# Patient Record
Sex: FEMALE | Race: WHITE | NOT HISPANIC OR LATINO | Employment: FULL TIME | URBAN - METROPOLITAN AREA
[De-identification: names, ages, dates, MRNs, and addresses within clinical notes are randomized per-mention and may not be internally consistent; named-entity substitution may affect disease eponyms.]

---

## 2017-02-15 ENCOUNTER — HOSPITAL ENCOUNTER (OUTPATIENT)
Dept: LAB | Facility: MEDICAL CENTER | Age: 57
End: 2017-02-15
Attending: FAMILY MEDICINE
Payer: COMMERCIAL

## 2017-02-15 LAB
ALT SERPL-CCNC: 43 U/L (ref 2–50)
AST SERPL-CCNC: 20 U/L (ref 12–45)
CHOLEST SERPL-MCNC: 222 MG/DL (ref 100–199)
HDLC SERPL-MCNC: 36 MG/DL
LDLC SERPL CALC-MCNC: 138 MG/DL
TRIGL SERPL-MCNC: 241 MG/DL (ref 0–149)

## 2017-02-15 PROCEDURE — 84460 ALANINE AMINO (ALT) (SGPT): CPT

## 2017-02-15 PROCEDURE — 80061 LIPID PANEL: CPT

## 2017-02-15 PROCEDURE — 36415 COLL VENOUS BLD VENIPUNCTURE: CPT

## 2017-02-15 PROCEDURE — 84450 TRANSFERASE (AST) (SGOT): CPT

## 2017-07-27 ENCOUNTER — HOSPITAL ENCOUNTER (OUTPATIENT)
Dept: LAB | Facility: MEDICAL CENTER | Age: 57
End: 2017-07-27
Attending: FAMILY MEDICINE
Payer: COMMERCIAL

## 2017-07-27 LAB
ALT SERPL-CCNC: 21 U/L (ref 2–50)
AST SERPL-CCNC: 15 U/L (ref 12–45)
CHOLEST SERPL-MCNC: 204 MG/DL (ref 100–199)
HDLC SERPL-MCNC: 34 MG/DL
LDLC SERPL CALC-MCNC: 131 MG/DL
TRIGL SERPL-MCNC: 197 MG/DL (ref 0–149)

## 2017-07-27 PROCEDURE — 84450 TRANSFERASE (AST) (SGOT): CPT

## 2017-07-27 PROCEDURE — 84460 ALANINE AMINO (ALT) (SGPT): CPT

## 2017-07-27 PROCEDURE — 80061 LIPID PANEL: CPT

## 2017-07-27 PROCEDURE — 36415 COLL VENOUS BLD VENIPUNCTURE: CPT

## 2018-02-21 ENCOUNTER — HOSPITAL ENCOUNTER (OUTPATIENT)
Dept: LAB | Facility: MEDICAL CENTER | Age: 58
End: 2018-02-21
Attending: FAMILY MEDICINE
Payer: COMMERCIAL

## 2018-02-21 LAB
ALT SERPL-CCNC: 51 U/L (ref 2–50)
AST SERPL-CCNC: 28 U/L (ref 12–45)
CHOLEST SERPL-MCNC: 240 MG/DL (ref 100–199)
HDLC SERPL-MCNC: 36 MG/DL
LDLC SERPL CALC-MCNC: ABNORMAL MG/DL
TRIGL SERPL-MCNC: 646 MG/DL (ref 0–149)

## 2018-02-21 PROCEDURE — 84460 ALANINE AMINO (ALT) (SGPT): CPT

## 2018-02-21 PROCEDURE — 84450 TRANSFERASE (AST) (SGOT): CPT

## 2018-02-21 PROCEDURE — 36415 COLL VENOUS BLD VENIPUNCTURE: CPT

## 2018-02-21 PROCEDURE — 80061 LIPID PANEL: CPT

## 2018-08-27 ENCOUNTER — HOSPITAL ENCOUNTER (OUTPATIENT)
Dept: LAB | Facility: MEDICAL CENTER | Age: 58
End: 2018-08-27
Attending: FAMILY MEDICINE
Payer: COMMERCIAL

## 2018-08-27 LAB
ALT SERPL-CCNC: 49 U/L (ref 2–50)
AST SERPL-CCNC: 23 U/L (ref 12–45)
CHOLEST SERPL-MCNC: 132 MG/DL (ref 100–199)
HDLC SERPL-MCNC: 37 MG/DL
LDLC SERPL CALC-MCNC: 76 MG/DL
TRIGL SERPL-MCNC: 93 MG/DL (ref 0–149)

## 2018-08-27 PROCEDURE — 80061 LIPID PANEL: CPT

## 2018-08-27 PROCEDURE — 36415 COLL VENOUS BLD VENIPUNCTURE: CPT

## 2018-08-27 PROCEDURE — 84460 ALANINE AMINO (ALT) (SGPT): CPT

## 2018-08-27 PROCEDURE — 84450 TRANSFERASE (AST) (SGOT): CPT

## 2018-09-04 ENCOUNTER — HOSPITAL ENCOUNTER (OUTPATIENT)
Dept: RADIOLOGY | Facility: MEDICAL CENTER | Age: 58
End: 2018-09-04
Attending: FAMILY MEDICINE
Payer: COMMERCIAL

## 2018-09-04 DIAGNOSIS — Z12.31 VISIT FOR SCREENING MAMMOGRAM: ICD-10-CM

## 2018-09-04 PROCEDURE — 77067 SCR MAMMO BI INCL CAD: CPT

## 2019-04-11 ENCOUNTER — OFFICE VISIT (OUTPATIENT)
Dept: MEDICAL GROUP | Facility: PHYSICIAN GROUP | Age: 59
End: 2019-04-11

## 2019-04-11 VITALS
RESPIRATION RATE: 16 BRPM | OXYGEN SATURATION: 94 % | TEMPERATURE: 97.1 F | HEIGHT: 67 IN | HEART RATE: 81 BPM | DIASTOLIC BLOOD PRESSURE: 86 MMHG | WEIGHT: 185.4 LBS | SYSTOLIC BLOOD PRESSURE: 130 MMHG | BODY MASS INDEX: 29.1 KG/M2

## 2019-04-11 DIAGNOSIS — E78.5 HYPERLIPIDEMIA, UNSPECIFIED HYPERLIPIDEMIA TYPE: ICD-10-CM

## 2019-04-11 DIAGNOSIS — Z12.11 SCREENING FOR COLON CANCER: ICD-10-CM

## 2019-04-11 PROCEDURE — 99203 OFFICE O/P NEW LOW 30 MIN: CPT | Performed by: INTERNAL MEDICINE

## 2019-04-11 RX ORDER — ATORVASTATIN CALCIUM 20 MG/1
TABLET, FILM COATED ORAL
Refills: 1 | COMMUNITY
Start: 2019-01-14 | End: 2019-07-31 | Stop reason: SDUPTHER

## 2019-04-11 RX ORDER — ZOSTER VACCINE RECOMBINANT, ADJUVANTED 50 MCG/0.5
KIT INTRAMUSCULAR
Refills: 0 | COMMUNITY
Start: 2019-04-06 | End: 2019-04-10

## 2019-04-11 ASSESSMENT — PATIENT HEALTH QUESTIONNAIRE - PHQ9: CLINICAL INTERPRETATION OF PHQ2 SCORE: 0

## 2019-04-11 NOTE — ASSESSMENT & PLAN NOTE
Lab Results   Component Value Date/Time    CHOLSTRLTOT 132 08/27/2018 06:18 AM    LDL 76 08/27/2018 06:18 AM    HDL 37 (A) 08/27/2018 06:18 AM    TRIGLYCERIDE 93 08/27/2018 06:18 AM     Taking atorvastatin 20 mg daily for about a year. Can't recall what she was on prior to the atorvastatin. She lost 30 lbs in 9 months.

## 2019-04-11 NOTE — PROGRESS NOTES
PRIMARY CARE CLINIC NEW PATIENT H&P  Chief Complaint   Patient presents with   • Hyperlipidemia     History of Present Illness     Hyperlipidemia  Lab Results   Component Value Date/Time    CHOLSTRLTOT 132 2018 06:18 AM    LDL 76 2018 06:18 AM    HDL 37 (A) 2018 06:18 AM    TRIGLYCERIDE 93 2018 06:18 AM     Taking atorvastatin 20 mg daily for about a year. Can't recall what she was on prior to the atorvastatin. She lost 30 lbs in 9 months.     Current Outpatient Prescriptions   Medication Sig Dispense Refill   • atorvastatin (LIPITOR) 20 MG Tab Take  by mouth.  1     No current facility-administered medications for this visit.      Past Medical History:   Diagnosis Date   • Hyperlipidemia 2019     Past Surgical History:   Procedure Laterality Date   • HYSTERECTOMY, TOTAL ABDOMINAL      ovaries remain; for endometriosis    • TONSILLECTOMY       Social History   Substance Use Topics   • Smoking status: Former Smoker     Packs/day: 1.00     Years: 20.00     Quit date:    • Smokeless tobacco: Never Used   • Alcohol use Yes      Comment: infrequently      Social History     Social History Narrative    /      Family History   Problem Relation Age of Onset   • Lung Disease Father         COPD, smoker      Family Status   Relation Status   • Mo Alive   • Fa    • Bro Alive     Allergies: Patient has no known allergies.    ROS  Constitutional: Negative for fatigue/generalized weakness.   HEENT: Negative for  vision changes, hearing changes    Respiratory: Negative for shortness of breath  Cardiovascular: Negative for chest pain, palpitations  Gastrointestinal: Negative for blood in stool, constipation, diarrhea  Genitourinary: Negative for dysuria, polyuria  Musculoskeletal: Negative for myalgias, back pain, and joint pain.   Skin: Negative for rash  Neurological: Negative for numbness, tingling  Psychiatric/Behavioral: Negative for depression,  "anxiety       Objective   /86   Pulse 81   Temp 36.2 °C (97.1 °F)   Resp 16   Ht 1.689 m (5' 6.5\")   Wt 84.1 kg (185 lb 6.4 oz)   SpO2 94%  Body mass index is 29.48 kg/m².    General: Alert, oriented. In no acute distress   HEET: EOMI, PERRL, conjunctiva non-injected, sclera non-icteric.  Nares patent with no significant congestion or drainage.  Avani pinnae, external auditory canals, TM pearly gray with normal light reflex bilaterally.Oral mucous membranes pink and moist with no lesions.  Neck: supple with no cervical, subclavicular lymphadenopathy, JVD, palpable thyroid nodules   Lungs: clear to auscultation bilaterally with good excursion.  CV: regular rate and rhythm.  Abdomen soft, non-distended, non-tender with normal bowel sounds. No hepatosplenomegaly, no masses palpated  Skin: no lesions. Warm, dry   Psychiatric: appropriate mood and affect     Assessment and Plan   The following treatment plan was discussed     1. Screening for colon cancer  - REFERRAL TO GI FOR COLONOSCOPY    2. Hyperlipidemia, unspecified hyperlipidemia type  - Comp Metabolic Panel; Future  - CBC WITH DIFFERENTIAL; Future  - Lipid Profile; Future  - VITAMIN D,25 HYDROXY; Future      Return in about 1 year (around 4/11/2020).    Health Maintenance      Health Maintenance Due   Topic Date Due   • COLONOSCOPY  02/21/2010       Hayden Yarbrough MD  Internal Medicine  [facility]                   "

## 2019-07-19 ENCOUNTER — HOSPITAL ENCOUNTER (OUTPATIENT)
Dept: LAB | Facility: MEDICAL CENTER | Age: 59
End: 2019-07-19
Attending: INTERNAL MEDICINE
Payer: COMMERCIAL

## 2019-07-19 DIAGNOSIS — E78.5 HYPERLIPIDEMIA, UNSPECIFIED HYPERLIPIDEMIA TYPE: ICD-10-CM

## 2019-07-19 LAB
25(OH)D3 SERPL-MCNC: 68 NG/ML (ref 30–100)
ALBUMIN SERPL BCP-MCNC: 4.4 G/DL (ref 3.2–4.9)
ALBUMIN/GLOB SERPL: 1.5 G/DL
ALP SERPL-CCNC: 73 U/L (ref 30–99)
ALT SERPL-CCNC: 37 U/L (ref 2–50)
ANION GAP SERPL CALC-SCNC: 9 MMOL/L (ref 0–11.9)
AST SERPL-CCNC: 16 U/L (ref 12–45)
BILIRUB SERPL-MCNC: 0.3 MG/DL (ref 0.1–1.5)
BUN SERPL-MCNC: 26 MG/DL (ref 8–22)
CALCIUM SERPL-MCNC: 9.6 MG/DL (ref 8.5–10.5)
CHLORIDE SERPL-SCNC: 110 MMOL/L (ref 96–112)
CHOLEST SERPL-MCNC: 138 MG/DL (ref 100–199)
CO2 SERPL-SCNC: 22 MMOL/L (ref 20–33)
CREAT SERPL-MCNC: 0.84 MG/DL (ref 0.5–1.4)
FASTING STATUS PATIENT QL REPORTED: NORMAL
GLOBULIN SER CALC-MCNC: 2.9 G/DL (ref 1.9–3.5)
GLUCOSE SERPL-MCNC: 105 MG/DL (ref 65–99)
HDLC SERPL-MCNC: 37 MG/DL
LDLC SERPL CALC-MCNC: 61 MG/DL
POTASSIUM SERPL-SCNC: 3.9 MMOL/L (ref 3.6–5.5)
PROT SERPL-MCNC: 7.3 G/DL (ref 6–8.2)
SODIUM SERPL-SCNC: 141 MMOL/L (ref 135–145)
TRIGL SERPL-MCNC: 199 MG/DL (ref 0–149)

## 2019-07-19 PROCEDURE — 82306 VITAMIN D 25 HYDROXY: CPT

## 2019-07-19 PROCEDURE — 80061 LIPID PANEL: CPT

## 2019-07-19 PROCEDURE — 36415 COLL VENOUS BLD VENIPUNCTURE: CPT

## 2019-07-19 PROCEDURE — 85025 COMPLETE CBC W/AUTO DIFF WBC: CPT

## 2019-07-19 PROCEDURE — 80053 COMPREHEN METABOLIC PANEL: CPT

## 2019-07-20 LAB
BASOPHILS # BLD AUTO: 0.9 % (ref 0–1.8)
BASOPHILS # BLD: 0.07 K/UL (ref 0–0.12)
EOSINOPHIL # BLD AUTO: 0.3 K/UL (ref 0–0.51)
EOSINOPHIL NFR BLD: 3.7 % (ref 0–6.9)
ERYTHROCYTE [DISTWIDTH] IN BLOOD BY AUTOMATED COUNT: 44.6 FL (ref 35.9–50)
HCT VFR BLD AUTO: 50.7 % (ref 37–47)
HGB BLD-MCNC: 16 G/DL (ref 12–16)
IMM GRANULOCYTES # BLD AUTO: 0.04 K/UL (ref 0–0.11)
IMM GRANULOCYTES NFR BLD AUTO: 0.5 % (ref 0–0.9)
LYMPHOCYTES # BLD AUTO: 1.93 K/UL (ref 1–4.8)
LYMPHOCYTES NFR BLD: 23.7 % (ref 22–41)
MCH RBC QN AUTO: 29.7 PG (ref 27–33)
MCHC RBC AUTO-ENTMCNC: 31.6 G/DL (ref 33.6–35)
MCV RBC AUTO: 94.1 FL (ref 81.4–97.8)
MONOCYTES # BLD AUTO: 0.81 K/UL (ref 0–0.85)
MONOCYTES NFR BLD AUTO: 9.9 % (ref 0–13.4)
NEUTROPHILS # BLD AUTO: 5.01 K/UL (ref 2–7.15)
NEUTROPHILS NFR BLD: 61.3 % (ref 44–72)
NRBC # BLD AUTO: 0 K/UL
NRBC BLD-RTO: 0 /100 WBC
PLATELET # BLD AUTO: 247 K/UL (ref 164–446)
PMV BLD AUTO: 11 FL (ref 9–12.9)
RBC # BLD AUTO: 5.39 M/UL (ref 4.2–5.4)
WBC # BLD AUTO: 8.2 K/UL (ref 4.8–10.8)

## 2019-07-22 ENCOUNTER — TELEPHONE (OUTPATIENT)
Dept: MEDICAL GROUP | Facility: PHYSICIAN GROUP | Age: 59
End: 2019-07-22

## 2019-07-22 NOTE — TELEPHONE ENCOUNTER
----- Message from Hayden Yarbrough M.D. sent at 7/22/2019  9:10 AM PDT -----  Please call her and let her know that her fasting sugar was slightly elevated and her triglycerides are a bit high as well. Would like f/u appt to discuss

## 2019-09-24 RX ORDER — ATORVASTATIN CALCIUM 20 MG/1
TABLET, FILM COATED ORAL
Qty: 90 TAB | Refills: 1 | Status: SHIPPED | OUTPATIENT
Start: 2019-09-24 | End: 2020-03-16

## 2019-09-24 NOTE — TELEPHONE ENCOUNTER
Was the patient seen in the last year in this department? Yes    Does patient have an active prescription for medications requested? No     Received Request Via: Pharmacy    Pt met protocol?: Yes     Last OV 04/11/19    Lab Results   Component Value Date/Time    CHOLSTRLTOT 138 07/19/2019 0634    TRIGLYCERIDE 199 (H) 07/19/2019 0634    HDL 37 (A) 07/19/2019 0634    LDL 61 07/19/2019 0634

## 2019-09-24 NOTE — TELEPHONE ENCOUNTER
Pt has had OV within the 12 month protocol and lipid panel is current. 6 month supply sent to pharmacy.   Lab Results   Component Value Date/Time    CHOLSTRLTOT 138 07/19/2019 06:34 AM    LDL 61 07/19/2019 06:34 AM    HDL 37 (A) 07/19/2019 06:34 AM    TRIGLYCERIDE 199 (H) 07/19/2019 06:34 AM       Lab Results   Component Value Date/Time    SODIUM 141 07/19/2019 06:34 AM    POTASSIUM 3.9 07/19/2019 06:34 AM    CHLORIDE 110 07/19/2019 06:34 AM    CO2 22 07/19/2019 06:34 AM    GLUCOSE 105 (H) 07/19/2019 06:34 AM    BUN 26 (H) 07/19/2019 06:34 AM    CREATININE 0.84 07/19/2019 06:34 AM     Lab Results   Component Value Date/Time    ALKPHOSPHAT 73 07/19/2019 06:34 AM    ASTSGOT 16 07/19/2019 06:34 AM    ALTSGPT 37 07/19/2019 06:34 AM    TBILIRUBIN 0.3 07/19/2019 06:34 AM

## 2020-06-08 RX ORDER — ATORVASTATIN CALCIUM 20 MG/1
20 TABLET, FILM COATED ORAL DAILY
Qty: 30 TAB | Refills: 0 | Status: SHIPPED | OUTPATIENT
Start: 2020-06-08 | End: 2020-07-07

## 2020-06-08 NOTE — TELEPHONE ENCOUNTER
Last seen by PCP 04/11/2019. Needs to establish with new pcp.      Will send 1 month(s) to the pharmacy. Pt to make appt and get labs prior to more refills. Given 3 month courtesy fill already    Lab Results   Component Value Date/Time    CHOLSTRLTOT 138 07/19/2019 06:34 AM    LDL 61 07/19/2019 06:34 AM    HDL 37 (A) 07/19/2019 06:34 AM    TRIGLYCERIDE 199 (H) 07/19/2019 06:34 AM       Lab Results   Component Value Date/Time    SODIUM 141 07/19/2019 06:34 AM    POTASSIUM 3.9 07/19/2019 06:34 AM    CHLORIDE 110 07/19/2019 06:34 AM    CO2 22 07/19/2019 06:34 AM    GLUCOSE 105 (H) 07/19/2019 06:34 AM    BUN 26 (H) 07/19/2019 06:34 AM    CREATININE 0.84 07/19/2019 06:34 AM     Lab Results   Component Value Date/Time    ALKPHOSPHAT 73 07/19/2019 06:34 AM    ASTSGOT 16 07/19/2019 06:34 AM    ALTSGPT 37 07/19/2019 06:34 AM    TBILIRUBIN 0.3 07/19/2019 06:34 AM

## 2020-07-07 RX ORDER — ATORVASTATIN CALCIUM 20 MG/1
TABLET, FILM COATED ORAL
Qty: 14 TAB | Refills: 0 | Status: SHIPPED | OUTPATIENT
Start: 2020-07-07 | End: 2020-08-31

## 2020-07-07 NOTE — TELEPHONE ENCOUNTER
Patient needs new PCP. Last seen by PCP 4/11/2019. Will send 14 days(s) to the pharmacy.  Pt to make appt prior to more refills.

## 2020-08-28 RX ORDER — ATORVASTATIN CALCIUM 20 MG/1
TABLET, FILM COATED ORAL
Qty: 14 TAB | Refills: 0 | Status: CANCELLED | OUTPATIENT
Start: 2020-08-28

## 2020-08-31 RX ORDER — ATORVASTATIN CALCIUM 20 MG/1
20 TABLET, FILM COATED ORAL DAILY
Qty: 90 TAB | Refills: 0 | Status: SHIPPED | OUTPATIENT
Start: 2020-08-31 | End: 2020-11-05 | Stop reason: SDUPTHER

## 2020-09-11 ENCOUNTER — HOSPITAL ENCOUNTER (OUTPATIENT)
Dept: RADIOLOGY | Facility: MEDICAL CENTER | Age: 60
End: 2020-09-11
Attending: INTERNAL MEDICINE
Payer: COMMERCIAL

## 2020-09-11 DIAGNOSIS — Z12.31 VISIT FOR SCREENING MAMMOGRAM: ICD-10-CM

## 2020-09-11 PROCEDURE — 77067 SCR MAMMO BI INCL CAD: CPT

## 2020-11-05 ENCOUNTER — HOSPITAL ENCOUNTER (OUTPATIENT)
Dept: LAB | Facility: MEDICAL CENTER | Age: 60
End: 2020-11-05
Attending: INTERNAL MEDICINE
Payer: COMMERCIAL

## 2020-11-05 ENCOUNTER — OFFICE VISIT (OUTPATIENT)
Dept: MEDICAL GROUP | Facility: PHYSICIAN GROUP | Age: 60
End: 2020-11-05
Payer: COMMERCIAL

## 2020-11-05 VITALS
WEIGHT: 199 LBS | BODY MASS INDEX: 30.16 KG/M2 | HEART RATE: 88 BPM | DIASTOLIC BLOOD PRESSURE: 88 MMHG | TEMPERATURE: 97.7 F | RESPIRATION RATE: 16 BRPM | OXYGEN SATURATION: 97 % | HEIGHT: 68 IN | SYSTOLIC BLOOD PRESSURE: 134 MMHG

## 2020-11-05 DIAGNOSIS — R73.9 HYPERGLYCEMIA: ICD-10-CM

## 2020-11-05 DIAGNOSIS — Z11.59 NEED FOR HEPATITIS C SCREENING TEST: ICD-10-CM

## 2020-11-05 DIAGNOSIS — Z12.11 COLON CANCER SCREENING: ICD-10-CM

## 2020-11-05 DIAGNOSIS — E78.5 HYPERLIPIDEMIA, UNSPECIFIED HYPERLIPIDEMIA TYPE: ICD-10-CM

## 2020-11-05 DIAGNOSIS — Z76.89 ENCOUNTER TO ESTABLISH CARE WITH NEW DOCTOR: ICD-10-CM

## 2020-11-05 DIAGNOSIS — D75.1 ERYTHROCYTOSIS: ICD-10-CM

## 2020-11-05 LAB
ALT SERPL-CCNC: 35 U/L (ref 2–50)
ANION GAP SERPL CALC-SCNC: 15 MMOL/L (ref 7–16)
BASOPHILS # BLD AUTO: 0.9 % (ref 0–1.8)
BASOPHILS # BLD: 0.06 K/UL (ref 0–0.12)
BUN SERPL-MCNC: 17 MG/DL (ref 8–22)
CALCIUM SERPL-MCNC: 10.6 MG/DL (ref 8.5–10.5)
CHLORIDE SERPL-SCNC: 100 MMOL/L (ref 96–112)
CHOLEST SERPL-MCNC: 178 MG/DL (ref 100–199)
CO2 SERPL-SCNC: 25 MMOL/L (ref 20–33)
CREAT SERPL-MCNC: 0.93 MG/DL (ref 0.5–1.4)
CREAT UR-MCNC: 91.14 MG/DL
EOSINOPHIL # BLD AUTO: 0.29 K/UL (ref 0–0.51)
EOSINOPHIL NFR BLD: 4.4 % (ref 0–6.9)
ERYTHROCYTE [DISTWIDTH] IN BLOOD BY AUTOMATED COUNT: 42.2 FL (ref 35.9–50)
EST. AVERAGE GLUCOSE BLD GHB EST-MCNC: 143 MG/DL
FASTING STATUS PATIENT QL REPORTED: NORMAL
GLUCOSE SERPL-MCNC: 157 MG/DL (ref 65–99)
HBA1C MFR BLD: 6.6 % (ref 0–5.6)
HCT VFR BLD AUTO: 50.1 % (ref 37–47)
HCV AB SER QL: NORMAL
HDLC SERPL-MCNC: 40 MG/DL
HGB BLD-MCNC: 17 G/DL (ref 12–16)
IMM GRANULOCYTES # BLD AUTO: 0.02 K/UL (ref 0–0.11)
IMM GRANULOCYTES NFR BLD AUTO: 0.3 % (ref 0–0.9)
LDLC SERPL CALC-MCNC: 81 MG/DL
LYMPHOCYTES # BLD AUTO: 1.6 K/UL (ref 1–4.8)
LYMPHOCYTES NFR BLD: 24.3 % (ref 22–41)
MCH RBC QN AUTO: 30.8 PG (ref 27–33)
MCHC RBC AUTO-ENTMCNC: 33.9 G/DL (ref 33.6–35)
MCV RBC AUTO: 90.8 FL (ref 81.4–97.8)
MICROALBUMIN UR-MCNC: <1.2 MG/DL
MICROALBUMIN/CREAT UR: NORMAL MG/G (ref 0–30)
MONOCYTES # BLD AUTO: 0.7 K/UL (ref 0–0.85)
MONOCYTES NFR BLD AUTO: 10.6 % (ref 0–13.4)
NEUTROPHILS # BLD AUTO: 3.91 K/UL (ref 2–7.15)
NEUTROPHILS NFR BLD: 59.5 % (ref 44–72)
NRBC # BLD AUTO: 0 K/UL
NRBC BLD-RTO: 0 /100 WBC
PLATELET # BLD AUTO: 233 K/UL (ref 164–446)
PMV BLD AUTO: 10.5 FL (ref 9–12.9)
POTASSIUM SERPL-SCNC: 4.1 MMOL/L (ref 3.6–5.5)
RBC # BLD AUTO: 5.52 M/UL (ref 4.2–5.4)
SODIUM SERPL-SCNC: 140 MMOL/L (ref 135–145)
TRIGL SERPL-MCNC: 285 MG/DL (ref 0–149)
TSH SERPL DL<=0.005 MIU/L-ACNC: 1.34 UIU/ML (ref 0.38–5.33)
WBC # BLD AUTO: 6.6 K/UL (ref 4.8–10.8)

## 2020-11-05 PROCEDURE — 80048 BASIC METABOLIC PNL TOTAL CA: CPT

## 2020-11-05 PROCEDURE — 84443 ASSAY THYROID STIM HORMONE: CPT

## 2020-11-05 PROCEDURE — 80061 LIPID PANEL: CPT

## 2020-11-05 PROCEDURE — 84460 ALANINE AMINO (ALT) (SGPT): CPT

## 2020-11-05 PROCEDURE — 85025 COMPLETE CBC W/AUTO DIFF WBC: CPT

## 2020-11-05 PROCEDURE — 86803 HEPATITIS C AB TEST: CPT

## 2020-11-05 PROCEDURE — 82043 UR ALBUMIN QUANTITATIVE: CPT

## 2020-11-05 PROCEDURE — 99213 OFFICE O/P EST LOW 20 MIN: CPT | Performed by: INTERNAL MEDICINE

## 2020-11-05 PROCEDURE — 83036 HEMOGLOBIN GLYCOSYLATED A1C: CPT

## 2020-11-05 PROCEDURE — 36415 COLL VENOUS BLD VENIPUNCTURE: CPT

## 2020-11-05 PROCEDURE — 82570 ASSAY OF URINE CREATININE: CPT

## 2020-11-05 RX ORDER — ATORVASTATIN CALCIUM 20 MG/1
20 TABLET, FILM COATED ORAL DAILY
Qty: 90 TAB | Refills: 3 | Status: SHIPPED | OUTPATIENT
Start: 2020-11-05 | End: 2021-08-19 | Stop reason: SDUPTHER

## 2020-11-05 ASSESSMENT — FIBROSIS 4 INDEX: FIB4 SCORE: 0.64

## 2020-11-05 ASSESSMENT — PATIENT HEALTH QUESTIONNAIRE - PHQ9: CLINICAL INTERPRETATION OF PHQ2 SCORE: 0

## 2020-11-05 NOTE — PROGRESS NOTES
CC: Establish care  Follow-up hyperlipidemia      HPI: This is a 60 y.o. pt.  Pt's medical history is notable for:     Encounter to establish care with new doctor  Patient presents today to establish care here at this clinic.  Patient has not had blood test done for over 1 year.  She denies any new complaint.    Hyperglycemia  This is a chronic condition noted with previous lab test.  The patient is currently on diet therapy.    Hyperlipidemia  This is a chronic condition patient currently on Lipitor.  No side effects reported.  The patient is due for blood test.          REVIEW OF SYSTEMS:     Constitutional:  no fever / chills   Neurologic: no headaches, no numbness/tingling  Eyes: no changes in vision  ENT: no sore throat, no hearing loss  CV:  no chest pain, no palpitations  Pulmonary: no SOB, no cough    GI: no nausea / vomiting, no diarrhea, no constipation  :  no dysuria, no hematuria       Allergies: Patient has no known allergies.    Current Outpatient Medications Ordered in Epic   Medication Sig Dispense Refill   • atorvastatin (LIPITOR) 20 MG Tab Take 1 Tab by mouth every day. 90 Tab 3     No current Epic-ordered facility-administered medications on file.        Past Medical History:   Diagnosis Date   • Hyperlipidemia 2019        Past Surgical History:   Procedure Laterality Date   • HYSTERECTOMY, TOTAL ABDOMINAL      ovaries remain; for endometriosis    • TONSILLECTOMY          Family History   Problem Relation Age of Onset   • Lung Disease Father         COPD, smoker         Social History     Tobacco Use   Smoking Status Former Smoker   • Packs/day: 1.00   • Years: 20.00   • Pack years: 20.00   • Quit date:    • Years since quittin.8   Smokeless Tobacco Never Used          Social History     Substance and Sexual Activity   Alcohol Use Yes    Comment: infrequently          ------------------------------------------------------------------------------     PHYSICAL EXAM:   Vitals:     11/05/20 0709   BP: 134/88   Pulse: 88   Resp: 16   Temp: 36.5 °C (97.7 °F)   SpO2: 97%      Body mass index is 30.26 kg/m².         Constitutional: no acute distress  Neck: supple, no JVD  CV: heart RRR  Resp: normal effort, no wheezing or rales.  GI: abdomen soft, no obvious mass, no tenderness  Neuro: CN 2-12 grossly intact        -----------------------------------------------------------------------------    ASSESSMENT:   1. Colon cancer screening  REFERRAL TO GI FOR COLONOSCOPY   2. Hyperlipidemia, unspecified hyperlipidemia type  HEMOGLOBIN A1C    ALANINE AMINO-TRANS    Basic Metabolic Panel    CBC WITH DIFFERENTIAL    Lipid Profile    TSH    MICROALBUMIN CREAT RATIO URINE   3. Encounter to establish care with new doctor     4. Hyperglycemia             MEDICAL DECISION MAKING: TODAY'S ASSESSMENT / STATUS / PLAN:    Medically the patient is stable.  Lab tests ordered today.  Advised to call for the results after few days.  Recommend low-fat low-cholesterol low-carb diet.  Advised regular exercise and to maintain a normal weight.  Refer the patient to GI for colonoscopy.      Return in about 6 months (around 5/5/2021).       PATIENT EDUCATION:  -If any problems should arise, patient was advised to contact our office or go to ER to be evaluated.      Please note that this dictation was created using voice recognition software. I have made every reasonable attempt to correct obvious errors, but it is possible there are errors of grammar and possibly content that I did not discover before finalizing the note.

## 2020-11-05 NOTE — ASSESSMENT & PLAN NOTE
Patient presents today to establish care here at this clinic.  Patient has not had blood test done for over 1 year.  She denies any new complaint.

## 2020-11-05 NOTE — ASSESSMENT & PLAN NOTE
This is a chronic condition patient currently on Lipitor.  No side effects reported.  The patient is due for blood test.

## 2020-11-05 NOTE — ASSESSMENT & PLAN NOTE
This is a chronic condition noted with previous lab test.  The patient is currently on diet therapy.

## 2020-11-06 ENCOUNTER — PATIENT MESSAGE (OUTPATIENT)
Dept: MEDICAL GROUP | Facility: PHYSICIAN GROUP | Age: 60
End: 2020-11-06

## 2020-11-06 ENCOUNTER — TELEPHONE (OUTPATIENT)
Dept: MEDICAL GROUP | Facility: PHYSICIAN GROUP | Age: 60
End: 2020-11-06

## 2020-11-06 DIAGNOSIS — E11.9 TYPE 2 DIABETES MELLITUS WITHOUT COMPLICATION, WITHOUT LONG-TERM CURRENT USE OF INSULIN (HCC): Chronic | ICD-10-CM

## 2020-11-06 PROBLEM — D75.1 ERYTHROCYTOSIS: Chronic | Status: ACTIVE | Noted: 2020-11-05

## 2020-11-06 RX ORDER — LANCETS 30 GAUGE
EACH MISCELLANEOUS
Qty: 100 EACH | Refills: 3 | Status: SHIPPED | OUTPATIENT
Start: 2020-11-06 | End: 2021-12-27 | Stop reason: SDUPTHER

## 2020-11-06 RX ORDER — GLUCOSAMINE HCL/CHONDROITIN SU 500-400 MG
CAPSULE ORAL
Qty: 100 EACH | Refills: 3 | Status: SHIPPED | OUTPATIENT
Start: 2020-11-06 | End: 2021-12-27 | Stop reason: SDUPTHER

## 2020-11-06 NOTE — TELEPHONE ENCOUNTER
----- Message from Judd Mercado M.D. sent at 11/5/2020  3:48 PM PST -----    Please call patient :prelim lab showed     -elevated A1c 6.6% = Diabetes.  Please start/continue with low sweet low carb diet, continue with regular exercises / walking and maintain an ideal weight.    -the red blood count [hemoglobin and hematocrit]: are high. These were noted before. The exact cause is unclear  A referral has been submitted to HEMATOLOGY  for further evaluation.

## 2020-11-07 NOTE — TELEPHONE ENCOUNTER
----- Message from Judd Mercado M.D. sent at 11/6/2020  1:28 PM PST -----    Please call patient :    Cholesterol 178  Triglycerides high 285.   Please start/continue with low fat low cholesterol diet, continue to exercise regularly and maintain an ideal weight.    Sugar level high, A1c  6.6 indicated pt with DIABETES. pls arrange a f.u appt to see dr Mercado in the next 3 wks. to discuss treatment options.

## 2020-12-04 ENCOUNTER — TELEMEDICINE (OUTPATIENT)
Dept: MEDICAL GROUP | Facility: PHYSICIAN GROUP | Age: 60
End: 2020-12-04
Payer: COMMERCIAL

## 2020-12-04 VITALS — HEIGHT: 68 IN | WEIGHT: 187 LBS | BODY MASS INDEX: 28.34 KG/M2

## 2020-12-04 DIAGNOSIS — E11.9 TYPE 2 DIABETES MELLITUS WITHOUT COMPLICATION, WITHOUT LONG-TERM CURRENT USE OF INSULIN (HCC): Chronic | ICD-10-CM

## 2020-12-04 DIAGNOSIS — E83.52 HYPERCALCEMIA: ICD-10-CM

## 2020-12-04 DIAGNOSIS — E78.5 HYPERLIPIDEMIA, UNSPECIFIED HYPERLIPIDEMIA TYPE: ICD-10-CM

## 2020-12-04 DIAGNOSIS — D75.1 ERYTHROCYTOSIS: Chronic | ICD-10-CM

## 2020-12-04 PROCEDURE — 99214 OFFICE O/P EST MOD 30 MIN: CPT | Mod: 95,CR | Performed by: INTERNAL MEDICINE

## 2020-12-04 RX ORDER — BLOOD SUGAR DIAGNOSTIC
STRIP MISCELLANEOUS
COMMUNITY
Start: 2020-11-10 | End: 2021-03-16

## 2020-12-04 RX ORDER — BLOOD-GLUCOSE METER
EACH MISCELLANEOUS
COMMUNITY
Start: 2020-11-10 | End: 2021-12-27

## 2020-12-04 SDOH — HEALTH STABILITY: MENTAL HEALTH: HOW OFTEN DO YOU HAVE A DRINK CONTAINING ALCOHOL?: MONTHLY OR LESS

## 2020-12-04 ASSESSMENT — FIBROSIS 4 INDEX: FIB4 SCORE: 0.7

## 2020-12-04 NOTE — PROGRESS NOTES
This visit was conducted via  [employer] Video Virtual Visit using secure and encrypted videoconferencing technology. The patient was in a private location in the state of [place].   The patient's identity was confirmed and verbal consent was obtained for this virtual visit.  -------------------------------------------------------------------------------    CC: Follow-up diabetes  Lab test result discussion         HPI: This is a 60 y.o. pt.  Pt's medical history is notable for:      Diabetes mellitus (HCC)  This is a new condition recently noted with lab tests.  Fasting sugar 157 and the A1c 6.6%.  Recommend for the patient to start taking Metformin however the patient is not interested in taking any medication at this time.  She was to work on diet and exercise and try to lose weight.  Patient has agreed to come back for follow-up with lab test in approximately 3 months.  Also refer the patient to diabetic education program.  Patient recently picked up diabetic meter and test strips.  She has started using it without any problems.    Erythrocytosis  This has been a chronic condition.  Previous hemoglobin was 16.3 in 2014, 16.8 in August 2016 and most recently 17.0 November 5, 2020  Patient completely asymptomatic.   No prior history of sleep apnea.    Hypercalcemia  Recent calcium level slightly high at 10.6.  Patient stated that she has been taking some calcium supplementation.  Patient asymptomatic.    Hyperlipidemia  Recent lab test show elevated triglyceride.  Diet and exercise advised.              REVIEW OF SYSTEMS:     Constitutional:  no fever / chills   Neurologic: no headaches, no numbness/tingling  Eyes: no changes in vision  ENT: no sore throat, no hearing loss  CV:  no chest pain, no palpitations  Pulmonary: no SOB, no cough    GI: no nausea / vomiting, no diarrhea, no constipation  :  no dysuria, no hematuria       Allergies: Patient has no known allergies.    Current Outpatient Medications Ordered in  Epic   Medication Sig Dispense Refill   • atorvastatin (LIPITOR) 20 MG Tab Take 1 Tab by mouth every day. 90 Tab 3   • Blood Glucose Meter Kit Per insurance coverage. Check blood sugar up to 2-3x per day and prn ssx of high or low sugar 1 Kit 0   • Blood Glucose Test Strips Per insurance coverage. Check blood sugar up to 2-3x per day and prn ssx of high or low sugar 100 Each 3   • Lancets Per insurance coverage. Check blood sugar up to 2-3x per day and prn ssx of high or low sugar 100 Each 3   • Alcohol Swabs Per insurance coverage. Wipe site with prep pad prior to injection 100 Each 3     No current Epic-ordered facility-administered medications on file.        Past Medical History:   Diagnosis Date   • Hyperlipidemia 2019        Past Surgical History:   Procedure Laterality Date   • HYSTERECTOMY, TOTAL ABDOMINAL      ovaries remain; for endometriosis    • TONSILLECTOMY          Family History   Problem Relation Age of Onset   • Lung Disease Father         COPD, smoker         Social History     Tobacco Use   Smoking Status Former Smoker   • Packs/day: 1.00   • Years: 20.00   • Pack years: 20.00   • Quit date:    • Years since quittin.9   Smokeless Tobacco Never Used          Social History     Substance and Sexual Activity   Alcohol Use Yes   • Frequency: Monthly or less    Comment: infrequently         ---------------------------------------------------------------------    PHYSICAL EXAM:   Psych:  A&O x 3, mood and affect appropriate  Constitutional: no distress  Skin: No apparent rashes  Eye: Conjunctiva clear, no icterus  ENMT: Lips without lesions. Phonation normal.  Neck: No obvious masses visible, no thyromegaly.   Respiratory: Unlabored respiratory effort    ---------------------------------------------------------------------    ASSESSMENT:   1. Type 2 diabetes mellitus without complication, without long-term current use of insulin ([facility])  HEMOGLOBIN A1C   2. Erythrocytosis  REFERRAL TO  PULMONARY AND SLEEP MEDICINE   3. Hypercalcemia  Basic Metabolic Panel    PTH INTACT   4. Hyperlipidemia, unspecified hyperlipidemia type  REFERRAL TO DIABETIC EDUCATION    ALANINE AMINO-TRANS    Lipid Profile          MEDICAL DECISION MAKING: TODAY'S DISCUSSION / STATUS / PLAN:    Diabetes mellitus.  The patient is now interested in taking a medication at this time.  Diet and exercise advised.  Patient has agreed to come back for follow-up in 3 months with lab test for follow-up.    Erythrocytosis.  Hemoglobin has been noted to be elevated since at least the last 6 years.  Patient is asymptomatic.  Rule out possible sleep apnea versus others.  Sleep study requested.  And the patient was referred also to hematology.    Hypercalcemia.  The patient has stopped taking over-the-counter calcium supplementation.  Lab tests for calcium and PTH ordered for follow-up.    Hyperlipidemia.  Advised the patient regarding diet and exercise.  We will plan to repeat lipid panel in 3 months.  Continue with atorvastatin.       Return in about 3 months (around 3/4/2021).       PATIENT EDUCATION:  -If any problems should arise, patient was advised to contact our office or go to ER to be evaluated.      Please note that this dictation was created using voice recognition software. I have made every reasonable attempt to correct obvious errors, but it is possible there are errors of grammar and possibly content that I did not discover before finalizing the note.

## 2020-12-04 NOTE — ASSESSMENT & PLAN NOTE
Recent calcium level slightly high at 10.6.  Patient stated that she has been taking some calcium supplementation.  Patient asymptomatic.

## 2020-12-04 NOTE — ASSESSMENT & PLAN NOTE
This is a new condition recently noted with lab tests.  Fasting sugar 157 and the A1c 6.6%.  Recommend for the patient to start taking Metformin however the patient is not interested in taking any medication at this time.  She was to work on diet and exercise and try to lose weight.  Patient has agreed to come back for follow-up with lab test in approximately 3 months.  Also refer the patient to diabetic education program.  Patient recently picked up diabetic meter and test strips.  She has started using it without any problems.

## 2020-12-04 NOTE — ASSESSMENT & PLAN NOTE
This has been a chronic condition.  Previous hemoglobin was 16.3 in 2014, 16.8 in August 2016 and most recently 17.0 November 5, 2020  Patient completely asymptomatic.   No prior history of sleep apnea.

## 2020-12-24 ENCOUNTER — TELEMEDICINE (OUTPATIENT)
Dept: HEALTH INFORMATION MANAGEMENT | Facility: MEDICAL CENTER | Age: 60
End: 2020-12-24
Payer: COMMERCIAL

## 2020-12-24 DIAGNOSIS — E78.5 HYPERLIPIDEMIA, UNSPECIFIED HYPERLIPIDEMIA TYPE: ICD-10-CM

## 2020-12-24 PROCEDURE — 97802 MEDICAL NUTRITION INDIV IN: CPT | Mod: 95,CR | Performed by: DIETITIAN, REGISTERED

## 2020-12-24 NOTE — PROGRESS NOTES
This evaluation was conducted via Zoom using secure and encrypted videoconferencing technology due to COVID19. The patient was in a private location in the state of [place].    The patient's identity was confirmed and verbal consent was obtained for this virtual visit.    12/24/2020    Judd Mercado M.D.  60 y.o.   Time in/out: 11:30-12:30    Anthropometrics/Objective  There were no vitals filed for this visit.    There is no height or weight on file to calculate BMI.    Stated Goal Weight: 145 lbs  Estimated Caloric needs 1468 Kcal (MSJ) x1.2  See comprehensive patient history form for further information     Subjective:  - is active most days (2-3 miles walk or job)  - prediabetic  - woulld like to work on inSilicae changes  - gained about 10-15 lbs lately  - no access to gym  - checks BG    - avg 125 diego   - avg 130-140 in am   -95-80 in the afternoon   - goes higher in the evening again  - has been working carb [employer]   - biggest motivation = no meds  - doesn't want to ge diabetes  - wants to be healtheir  - usually sleeps 8-10 hours of sleep  - WFH  - holidays stressful  - takign care of elderly parent  - little work stress        Nutrition Diagnosis (PES Statement)  Problem (Nutrition diagnosis)  Clinical: Altered nutrition-related lab values    Etiology(Addresses the cause,contributing factors)  endocrine dysfunction    Signs/Symptoms (Address observations and stated info: subjective and objective data)  Client history: Condition(s) associated with a diagnosis or treatment of DM 2      Client history:  Condition(s) associated with a diagnosis or treatment (specify) DM, hyperlipidemia    Biochemical data, medical test and procedures  Lab Results   Component Value Date/Time    HBA1C 6.6 (H) 11/05/2020 07:29 AM   @  No results found for: POCGLUCOSE  Lab Results   Component Value Date/Time    CHOLSTRLTOT 178 11/05/2020 07:29 AM    LDL 81 11/05/2020 07:29 AM    HDL 40 11/05/2020 07:29 AM    TRIGLYCERIDE 285 (H)  11/05/2020 07:29 AM         Nutrition Intervention    Meal and Snack  Recommend a general/healthful diet    Comprehensive Nutrition education Instruction or training leading to in-depth nutrition related knowledge about:  Combine carb, protein and fat at each meal, Meal timing and spacing and Portion control    Monitoring & Evaluation Plan    Behavioral-Environmental:  Behavior: continue tracking    Food / Nutrient Intake:  Food intake: follow the plate method    Physical Signs / Symptoms:  HbA1c profiles: WNL    Assessment Notes:  Jessica has been working on her health goals towardsbetter BG control as well as weight loss. She has noticed a weight gain in the past several months and in addition her blood sugars have risen a bit too. She has been usign the plate method pretty well on her own, discussed using the plate method portions as well as. At this visit we reviewed the plate planner, snack ideas and the nutrition basics handout. Jessica currently tracks with [facility] - encouraged her to continue with this habit. Suggested 40% carb, 30% fat and protein goals for now. Seeing how this looks for her BG and weight loss goals. Encouraged her to reach out with any questions. Suggest reviewing nutrition facts label at next visit.     Follow up: 4-6 weeks

## 2021-01-28 ENCOUNTER — NON-PROVIDER VISIT (OUTPATIENT)
Dept: HEALTH INFORMATION MANAGEMENT | Facility: MEDICAL CENTER | Age: 61
End: 2021-01-28
Payer: COMMERCIAL

## 2021-01-28 DIAGNOSIS — E78.5 HYPERLIPIDEMIA, UNSPECIFIED HYPERLIPIDEMIA TYPE: ICD-10-CM

## 2021-01-28 PROCEDURE — 97803 MED NUTRITION INDIV SUBSEQ: CPT | Mod: 95,CR | Performed by: DIETITIAN, REGISTERED

## 2021-01-28 NOTE — PROGRESS NOTES
This evaluation was conducted via Zoom using secure and encrypted videoconferencing technology. The patient was in a private location in the [facility].    The patient's identity was confirmed and verbal consent was obtained for this virtual visit.    Nutrition Reassess    1/28/2021    Judd Mercado M.D.   60 y.o.     Time In/Out: 1:00-1:37      Subjective:  - down to 181 lbs  - lost 6-7 lbs  - has been working on changes  - more protein and NS veggies in diet  - has been watchign carb portions  - working on portions in general  - more aware of food composition  - BG avg 112-115!   - down 10-20 points  - MFP tracking  - recipes from M[employer] and allecipes    Anthropometrics/Objective    There were no vitals filed for this visit.  There is no height or weight on file to calculate BMI.        Weight Hx:  181 lbs (1/28/21)    Total weight change: stated 7 lbs loss      ReAssesment/Notes:    Jessica has been working on her health goals towards weight loss, better BG and lipid control, and overall health. She has been watching her portions, especially for carbs, staying active, and understanding her overall portions more. She states this has helped her lose weight as well as lower her average BG reading! Applauded her for these efforts! Shared the [employer] resource as well for meal ideas.  At this visit, we reviewed the nutrition facts label. We were able to help identify nutrients, what they mean, how they affect our health and look at trusted food claims as well. Encouraged Jessica to reach out with any questions.       Follow-up: 3 months

## 2021-03-15 DIAGNOSIS — Z23 NEED FOR VACCINATION: ICD-10-CM

## 2021-03-16 RX ORDER — BLOOD SUGAR DIAGNOSTIC
STRIP MISCELLANEOUS
Qty: 300 STRIP | Refills: 3 | Status: SHIPPED | OUTPATIENT
Start: 2021-03-16 | End: 2023-09-08

## 2021-08-19 RX ORDER — ATORVASTATIN CALCIUM 20 MG/1
20 TABLET, FILM COATED ORAL DAILY
Qty: 90 TABLET | Refills: 0 | Status: SHIPPED | OUTPATIENT
Start: 2021-08-19 | End: 2021-11-17 | Stop reason: SDUPTHER

## 2021-08-19 NOTE — TELEPHONE ENCOUNTER
Patient requests change in pharmacy.     [facility] - [place], [place] - [address] [place] [id number]  Phone: [phone] Fax: [phone]

## 2021-11-17 ENCOUNTER — OFFICE VISIT (OUTPATIENT)
Dept: MEDICAL GROUP | Facility: PHYSICIAN GROUP | Age: 61
End: 2021-11-17
Payer: COMMERCIAL

## 2021-11-17 VITALS
RESPIRATION RATE: 16 BRPM | SYSTOLIC BLOOD PRESSURE: 128 MMHG | DIASTOLIC BLOOD PRESSURE: 78 MMHG | TEMPERATURE: 97.6 F | BODY MASS INDEX: 27.65 KG/M2 | HEART RATE: 90 BPM | HEIGHT: 68 IN | WEIGHT: 182.4 LBS | OXYGEN SATURATION: 99 %

## 2021-11-17 DIAGNOSIS — D75.1 ERYTHROCYTOSIS: Chronic | ICD-10-CM

## 2021-11-17 DIAGNOSIS — R22.1 NECK MASS: ICD-10-CM

## 2021-11-17 DIAGNOSIS — E78.5 HYPERLIPIDEMIA, UNSPECIFIED HYPERLIPIDEMIA TYPE: ICD-10-CM

## 2021-11-17 DIAGNOSIS — E83.52 HYPERCALCEMIA: Chronic | ICD-10-CM

## 2021-11-17 DIAGNOSIS — E11.9 TYPE 2 DIABETES MELLITUS WITHOUT COMPLICATION, WITHOUT LONG-TERM CURRENT USE OF INSULIN (HCC): Chronic | ICD-10-CM

## 2021-11-17 PROBLEM — Z76.89 ENCOUNTER TO ESTABLISH CARE WITH NEW DOCTOR: Status: RESOLVED | Noted: 2020-11-05 | Resolved: 2021-11-17

## 2021-11-17 PROCEDURE — 99214 OFFICE O/P EST MOD 30 MIN: CPT | Performed by: FAMILY MEDICINE

## 2021-11-17 RX ORDER — ATORVASTATIN CALCIUM 20 MG/1
20 TABLET, FILM COATED ORAL DAILY
Qty: 90 TABLET | Refills: 0 | Status: SHIPPED | OUTPATIENT
Start: 2021-11-17 | End: 2022-01-28 | Stop reason: SDUPTHER

## 2021-11-17 ASSESSMENT — PATIENT HEALTH QUESTIONNAIRE - PHQ9: CLINICAL INTERPRETATION OF PHQ2 SCORE: 0

## 2021-11-17 NOTE — PROGRESS NOTES
Subjective:     CC:   Chief Complaint   Patient presents with   • Establish Care       HPI:   eJssica presents today to establish care.  Patient has been working on her weight and knows she is overdue for her labs.  Patient has had a hysterectomy due to bleeding and endometriosis.  Patient states that she has been told that she may or may not have a cervix she just believes whoever she informs her since she does want to get a Pap smear done.    Past Medical History:   Diagnosis Date   • Hyperlipidemia 2019       Social History     Tobacco Use   • Smoking status: Former Smoker     Packs/day: 1.00     Years: 20.00     Pack years: 20.00     Quit date:      Years since quittin.8   • Smokeless tobacco: Never Used   Vaping Use   • Vaping Use: Former   Substance Use Topics   • Alcohol use: Yes     Comment: Occ   • Drug use: Yes     Types: Marijuana     Comment: Gummy       Current Outpatient Medications Ordered in Epic   Medication Sig Dispense Refill   • atorvastatin (LIPITOR) 20 MG Tab Take 1 Tablet by mouth every day. 90 Tablet 0   • ACCU-CHEK GUIDE strip CHECK BLOOD SUGAR UP TO 2-3X PER DAY AND AS NEEDED FOR HIGH OR LOW SUGAR 300 Strip 3   • Blood Glucose Monitoring Suppl (ACCU-CHEK GUIDE ME) w/Device Kit USE TO TEST SUGAR UP TO 2 3 TIMES PER DAY AND AS NEEDED FOR HIGH OR LOW SUGAR     • Blood Glucose Meter Kit Per insurance coverage. Check blood sugar up to 2-3x per day and prn ssx of high or low sugar 1 Kit 0   • Blood Glucose Test Strips Per insurance coverage. Check blood sugar up to 2-3x per day and prn ssx of high or low sugar 100 Each 3   • Lancets Per insurance coverage. Check blood sugar up to 2-3x per day and prn ssx of high or low sugar 100 Each 3   • Alcohol Swabs Per insurance coverage. Wipe site with prep pad prior to injection 100 Each 3     No current Epic-ordered facility-administered medications on file.       Allergies:  Patient has no known allergies.    Health Maintenance:  "Completed    ROS:  Gen: no fevers/chills  Eyes: no changes in vision  ENT: no sore throat, no hearing loss, no bloody nose  Pulm: no sob, no cough  CV: no chest pain, no palpitations  GI: no nausea/vomiting, no diarrhea  Skin: no rash  Neuro: no headaches, no numbness/tingling  Heme/Lymph: no easy bruising    Objective:     Exam:  /78   Pulse 90   Temp 36.4 °C (97.6 °F)   Resp 16   Ht 1.727 m (5' 8\")   Wt 82.7 kg (182 lb 6.4 oz)   SpO2 99%   BMI 27.73 kg/m²  Body mass index is 27.73 kg/m².    Gen: Alert and oriented, No apparent distress.  Skin: Warm and dry.  No obvious lesions.  Eyes: Sclera wnl Pupils normal in size  ENT: Canals wnl and TM are not red, Mallampati score of class III  Neck: Neck is supple , patient's neck is enlarged question whether her thyroid is enlarged unable to really feel it.  Lungs: Normal effort, CTA bilaterally, no wheezes, rhonchi, or rales  CV: Regular rate and rhythm. No murmurs, rubs, or gallops.  ABD: Soft non-tender no organomegaly  Musculoskeletal: Normal gait. No extremity cyanosis, clubbing, or edema.  Neuro: Oriented to person, place and time  Psych: Mood is wnl       Labs: Patient is due to get fasting lab work done we will go ahead and order this patient given a listing of all the [facility] labs    Assessment & Plan:     61 y.o. female with the following -     1. Type 2 diabetes mellitus without complication, without long-term current use of insulin (HCC)  We will go ahead and get lab work done this is a chronic problem  2. Erythrocytosis  Patient states she did see hematology who felt that her hemoglobin was just borderline and nothing to do any further work-up.  This is a chronic problem    3. Hypercalcemia  Patient's calcium was slightly elevated last time but a number years prior it was within normal limits we will go ahead and recheck this this is a chronic problem    4. Hyperlipidemia, unspecified hyperlipidemia type  Patient is taking medication for this we " will go ahead and recheck this and also refill her medication she may have only 10 days worth this is a chronic problem    5. Neck mass  We will do a thyroid test on her and also ultrasound of her neck patient states her neck is always been rather enlarged this is a chronic problem  - US-SOFT TISSUES OF HEAD - NECK; Future       Return in about 3 weeks (around 12/8/2021).    Please note that this dictation was created using voice recognition software. I have made every reasonable attempt to correct obvious errors, but I expect that there are errors of grammar and possibly content that I did not discover before finalizing the note.

## 2021-12-03 ENCOUNTER — HOSPITAL ENCOUNTER (OUTPATIENT)
Dept: RADIOLOGY | Facility: MEDICAL CENTER | Age: 61
End: 2021-12-03
Attending: FAMILY MEDICINE
Payer: COMMERCIAL

## 2021-12-03 DIAGNOSIS — R22.1 NECK MASS: ICD-10-CM

## 2021-12-03 PROCEDURE — 76536 US EXAM OF HEAD AND NECK: CPT

## 2021-12-15 ENCOUNTER — HOSPITAL ENCOUNTER (OUTPATIENT)
Dept: LAB | Facility: MEDICAL CENTER | Age: 61
End: 2021-12-15
Attending: FAMILY MEDICINE
Payer: COMMERCIAL

## 2021-12-15 DIAGNOSIS — E78.5 HYPERLIPIDEMIA, UNSPECIFIED HYPERLIPIDEMIA TYPE: ICD-10-CM

## 2021-12-15 DIAGNOSIS — E11.9 TYPE 2 DIABETES MELLITUS WITHOUT COMPLICATION, WITHOUT LONG-TERM CURRENT USE OF INSULIN (HCC): Chronic | ICD-10-CM

## 2021-12-15 DIAGNOSIS — E83.52 HYPERCALCEMIA: Chronic | ICD-10-CM

## 2021-12-15 DIAGNOSIS — D75.1 ERYTHROCYTOSIS: Chronic | ICD-10-CM

## 2021-12-15 DIAGNOSIS — R22.1 NECK MASS: ICD-10-CM

## 2021-12-15 LAB
ALBUMIN SERPL BCP-MCNC: 4.7 G/DL (ref 3.2–4.9)
ALBUMIN/GLOB SERPL: 1.5 G/DL
ALP SERPL-CCNC: 87 U/L (ref 30–99)
ALT SERPL-CCNC: 87 U/L (ref 2–50)
ANION GAP SERPL CALC-SCNC: 13 MMOL/L (ref 7–16)
AST SERPL-CCNC: 23 U/L (ref 12–45)
BASOPHILS # BLD AUTO: 0.7 % (ref 0–1.8)
BASOPHILS # BLD: 0.05 K/UL (ref 0–0.12)
BILIRUB SERPL-MCNC: 0.3 MG/DL (ref 0.1–1.5)
BUN SERPL-MCNC: 18 MG/DL (ref 8–22)
CALCIUM SERPL-MCNC: 10.3 MG/DL (ref 8.5–10.5)
CHLORIDE SERPL-SCNC: 104 MMOL/L (ref 96–112)
CHOLEST SERPL-MCNC: 150 MG/DL (ref 100–199)
CO2 SERPL-SCNC: 25 MMOL/L (ref 20–33)
CREAT SERPL-MCNC: 0.95 MG/DL (ref 0.5–1.4)
EOSINOPHIL # BLD AUTO: 0.42 K/UL (ref 0–0.51)
EOSINOPHIL NFR BLD: 5.8 % (ref 0–6.9)
ERYTHROCYTE [DISTWIDTH] IN BLOOD BY AUTOMATED COUNT: 42.8 FL (ref 35.9–50)
EST. AVERAGE GLUCOSE BLD GHB EST-MCNC: 131 MG/DL
FASTING STATUS PATIENT QL REPORTED: NORMAL
GLOBULIN SER CALC-MCNC: 3.1 G/DL (ref 1.9–3.5)
GLUCOSE SERPL-MCNC: 122 MG/DL (ref 65–99)
HBA1C MFR BLD: 6.2 % (ref 4–5.6)
HCT VFR BLD AUTO: 49.3 % (ref 37–47)
HDLC SERPL-MCNC: 35 MG/DL
HGB BLD-MCNC: 16.6 G/DL (ref 12–16)
IMM GRANULOCYTES # BLD AUTO: 0.02 K/UL (ref 0–0.11)
IMM GRANULOCYTES NFR BLD AUTO: 0.3 % (ref 0–0.9)
LDLC SERPL CALC-MCNC: 73 MG/DL
LYMPHOCYTES # BLD AUTO: 1.93 K/UL (ref 1–4.8)
LYMPHOCYTES NFR BLD: 26.7 % (ref 22–41)
MCH RBC QN AUTO: 30.5 PG (ref 27–33)
MCHC RBC AUTO-ENTMCNC: 33.7 G/DL (ref 33.6–35)
MCV RBC AUTO: 90.5 FL (ref 81.4–97.8)
MONOCYTES # BLD AUTO: 0.73 K/UL (ref 0–0.85)
MONOCYTES NFR BLD AUTO: 10.1 % (ref 0–13.4)
NEUTROPHILS # BLD AUTO: 4.09 K/UL (ref 2–7.15)
NEUTROPHILS NFR BLD: 56.4 % (ref 44–72)
NRBC # BLD AUTO: 0 K/UL
NRBC BLD-RTO: 0 /100 WBC
PLATELET # BLD AUTO: 237 K/UL (ref 164–446)
PMV BLD AUTO: 10.2 FL (ref 9–12.9)
POTASSIUM SERPL-SCNC: 4.2 MMOL/L (ref 3.6–5.5)
PROT SERPL-MCNC: 7.8 G/DL (ref 6–8.2)
RBC # BLD AUTO: 5.45 M/UL (ref 4.2–5.4)
SODIUM SERPL-SCNC: 142 MMOL/L (ref 135–145)
T3 SERPL-MCNC: 107 NG/DL (ref 60–181)
TRIGL SERPL-MCNC: 210 MG/DL (ref 0–149)
TSH SERPL DL<=0.005 MIU/L-ACNC: 1.96 UIU/ML (ref 0.38–5.33)
WBC # BLD AUTO: 7.2 K/UL (ref 4.8–10.8)

## 2021-12-15 PROCEDURE — 83036 HEMOGLOBIN GLYCOSYLATED A1C: CPT

## 2021-12-15 PROCEDURE — 36415 COLL VENOUS BLD VENIPUNCTURE: CPT

## 2021-12-15 PROCEDURE — 80061 LIPID PANEL: CPT

## 2021-12-15 PROCEDURE — 80053 COMPREHEN METABOLIC PANEL: CPT

## 2021-12-15 PROCEDURE — 85025 COMPLETE CBC W/AUTO DIFF WBC: CPT

## 2021-12-15 PROCEDURE — 84480 ASSAY TRIIODOTHYRONINE (T3): CPT

## 2021-12-15 PROCEDURE — 82306 VITAMIN D 25 HYDROXY: CPT

## 2021-12-15 PROCEDURE — 84443 ASSAY THYROID STIM HORMONE: CPT

## 2021-12-18 LAB — 25(OH)D3 SERPL-MCNC: 37 NG/ML (ref 30–80)

## 2021-12-27 ENCOUNTER — OFFICE VISIT (OUTPATIENT)
Dept: MEDICAL GROUP | Facility: PHYSICIAN GROUP | Age: 61
End: 2021-12-27
Payer: COMMERCIAL

## 2021-12-27 VITALS
WEIGHT: 185.1 LBS | DIASTOLIC BLOOD PRESSURE: 82 MMHG | RESPIRATION RATE: 16 BRPM | BODY MASS INDEX: 28.05 KG/M2 | SYSTOLIC BLOOD PRESSURE: 124 MMHG | TEMPERATURE: 97.6 F | HEIGHT: 68 IN | OXYGEN SATURATION: 97 % | HEART RATE: 98 BPM

## 2021-12-27 DIAGNOSIS — R79.89 LOW VITAMIN D LEVEL: ICD-10-CM

## 2021-12-27 DIAGNOSIS — E04.1 THYROID NODULE: ICD-10-CM

## 2021-12-27 DIAGNOSIS — E11.9 TYPE 2 DIABETES MELLITUS WITHOUT COMPLICATION, WITHOUT LONG-TERM CURRENT USE OF INSULIN (HCC): Chronic | ICD-10-CM

## 2021-12-27 DIAGNOSIS — Z12.31 ENCOUNTER FOR SCREENING MAMMOGRAM FOR MALIGNANT NEOPLASM OF BREAST: ICD-10-CM

## 2021-12-27 DIAGNOSIS — D75.1 ERYTHROCYTOSIS: Chronic | ICD-10-CM

## 2021-12-27 DIAGNOSIS — E78.5 HYPERLIPIDEMIA, UNSPECIFIED HYPERLIPIDEMIA TYPE: ICD-10-CM

## 2021-12-27 PROCEDURE — 99214 OFFICE O/P EST MOD 30 MIN: CPT | Performed by: FAMILY MEDICINE

## 2021-12-27 ASSESSMENT — FIBROSIS 4 INDEX: FIB4 SCORE: 0.63

## 2021-12-27 NOTE — PROGRESS NOTES
Subjective:     CC:   Chief Complaint   Patient presents with   • Follow-Up       HPI:   Jessica presents today to discuss the results of her labs and ultrasound.  Patient states she is doing well and has no complaints today.  Patient did remember when she last got her colonoscopy she states that they found polyps since recommended she gets it done again in 3 years.  Patient thought she had a mammogram done this year but it appears it was last year.    Past Medical History:   Diagnosis Date   • Hyperlipidemia 2019       Social History     Tobacco Use   • Smoking status: Former Smoker     Packs/day: 1.00     Years: 20.00     Pack years: 20.00     Quit date:      Years since quittin.9   • Smokeless tobacco: Never Used   Vaping Use   • Vaping Use: Former   Substance Use Topics   • Alcohol use: Yes     Comment: Occ   • Drug use: Yes     Types: Marijuana     Comment: Gummy       Current Outpatient Medications Ordered in Epic   Medication Sig Dispense Refill   • atorvastatin (LIPITOR) 20 MG Tab Take 1 Tablet by mouth every day. 90 Tablet 0   • ACCU-CHEK GUIDE strip CHECK BLOOD SUGAR UP TO 2-3X PER DAY AND AS NEEDED FOR HIGH OR LOW SUGAR 300 Strip 3   • Blood Glucose Meter Kit Per insurance coverage. Check blood sugar up to 2-3x per day and prn ssx of high or low sugar 1 Kit 0   • Lancets Per insurance coverage. Check blood sugar up to 2-3x per day and prn ssx of high or low sugar 100 Each 3   • Alcohol Swabs Per insurance coverage. Wipe site with prep pad prior to injection 100 Each 3     No current Epic-ordered facility-administered medications on file.       Allergies:  Patient has no known allergies.    Health Maintenance: Completed    ROS:  Gen: no fevers/chills  Eyes: no changes in vision  ENT: no sore throat, no hearing loss, no bloody nose  Pulm: no sob, no cough  CV: no chest pain, no palpitations  MSk: no myalgias  Skin: no rash  Neuro: no headaches, no numbness/tingling  Heme/Lymph: no easy  "bruising    Objective:     Exam:  /82   Pulse 98   Temp 36.4 °C (97.6 °F)   Resp 16   Ht 1.727 m (5' 8\")   Wt 84 kg (185 lb 1.6 oz)   SpO2 97%   BMI 28.14 kg/m²  Body mass index is 28.14 kg/m².    Gen: Alert and oriented, No apparent distress.  Skin: Warm and dry.  No obvious lesions.  Eyes: Sclera wnl Pupils normal in size  Lungs: Normal effort, CTA bilaterally, no wheezes, rhonchi, or rales  CV: Regular rate and rhythm. No murmurs, rubs, or gallops.  Musculoskeletal: Normal gait. No extremity cyanosis, clubbing, or edema.  Neuro: Oriented to person, place and time  Psych: Mood is wnl       Assessment & Plan:     61 y.o. female with the following -     1. Thyroid nodule  I discussed options fine to do needle aspiration or referral to ear nose and throat.  I think at this time I would like to refer to ENT to see what their recommendations are this is a acute problem  - Referral to Endocrinology    2. Encounter for screening mammogram for malignant neoplasm of breast  We will go ahead and send an order for a mammogram patient to continue her monthly self breast exams  - MA-SCREENING MAMMO BILAT W/TOMOSYNTHESIS W/O CAD; Future    3. Type 2 diabetes mellitus without complication, without long-term current use of insulin (HCC)  Her hemoglobin A1c looks better than its been before we will repeated again in early May this is a chronic problem    4. Erythrocytosis  Her hemoglobin is slightly decreased we will continue to monitor this this is a chronic problem    5. Hyperlipidemia, unspecified hyperlipidemia type  Patient's HDL is lower than its been encourage increased exercise we will recheck this again this is a chronic problem  6. Low vitamin D level  Would recommend patient start taking vitamin D at 5000 IUs/day this is a chronic problem       Return in about 4 months (around 4/27/2022).    Please note that this dictation was created using voice recognition software. I have made every reasonable attempt to " correct obvious errors, but I expect that there are errors of grammar and possibly content that I did not discover before finalizing the note.

## 2021-12-29 RX ORDER — LANCETS 30 GAUGE
EACH MISCELLANEOUS
Qty: 100 EACH | Refills: 3 | Status: SHIPPED | OUTPATIENT
Start: 2021-12-29 | End: 2022-04-15 | Stop reason: SDUPTHER

## 2021-12-29 RX ORDER — GLUCOSAMINE HCL/CHONDROITIN SU 500-400 MG
CAPSULE ORAL
Qty: 100 EACH | Refills: 3 | Status: SHIPPED | OUTPATIENT
Start: 2021-12-29 | End: 2023-09-08

## 2022-01-26 ENCOUNTER — HOSPITAL ENCOUNTER (OUTPATIENT)
Dept: RADIOLOGY | Facility: MEDICAL CENTER | Age: 62
End: 2022-01-26
Attending: FAMILY MEDICINE
Payer: COMMERCIAL

## 2022-01-26 DIAGNOSIS — Z12.31 ENCOUNTER FOR SCREENING MAMMOGRAM FOR MALIGNANT NEOPLASM OF BREAST: ICD-10-CM

## 2022-01-26 PROCEDURE — 77063 BREAST TOMOSYNTHESIS BI: CPT

## 2022-01-28 RX ORDER — ATORVASTATIN CALCIUM 20 MG/1
20 TABLET, FILM COATED ORAL DAILY
Qty: 90 TABLET | Refills: 0 | Status: SHIPPED | OUTPATIENT
Start: 2022-01-28 | End: 2022-04-29

## 2022-03-21 ENCOUNTER — OFFICE VISIT (OUTPATIENT)
Dept: ENDOCRINOLOGY | Facility: MEDICAL CENTER | Age: 62
End: 2022-03-21
Payer: COMMERCIAL

## 2022-03-21 VITALS
WEIGHT: 188.4 LBS | HEIGHT: 68 IN | OXYGEN SATURATION: 98 % | SYSTOLIC BLOOD PRESSURE: 126 MMHG | BODY MASS INDEX: 28.55 KG/M2 | DIASTOLIC BLOOD PRESSURE: 76 MMHG | HEART RATE: 98 BPM

## 2022-03-21 DIAGNOSIS — E04.1 THYROID NODULE: ICD-10-CM

## 2022-03-21 DIAGNOSIS — E13.9 DIABETES 1.5, MANAGED AS TYPE 2 (HCC): ICD-10-CM

## 2022-03-21 DIAGNOSIS — E55.9 VITAMIN D DEFICIENCY: ICD-10-CM

## 2022-03-21 PROCEDURE — 99211 OFF/OP EST MAY X REQ PHY/QHP: CPT

## 2022-03-21 PROCEDURE — 99203 OFFICE O/P NEW LOW 30 MIN: CPT

## 2022-03-21 ASSESSMENT — FIBROSIS 4 INDEX: FIB4 SCORE: 0.65

## 2022-03-21 NOTE — PROGRESS NOTES
Chief Complaint: Consult requested by Claudia Lynne M.D. for evaluation of Thyroid Nodule    HPI:   1. Thyroid Nodule:   Jessica Bishop is a 62 y.o. female with history of Thyroid Nodule diagnosed December 2021and is here for initial evaluation.      Thyroid US from December 3, 2021 was reviewed and discussed with the patient in detail.    This showed a 1.03 cm thyroid nodule on the right lateral thyroid lobe, solid, isoechoic, taller than wide, TR 5  Thyroid gland was heterogeneous with normal vascularity    She denies prior thyroid biopsy and denies prior thyroid surgery.    She denies lumps or enlargement in the neck.   Denies hoarseness, dysphagia, dyspnea, anterior neck pain and anterior neck swelling.     She reports a family history of goiter.    She denies a history of radiation exposure to head or neck.    She reports weight gain, sweating, heat intolerance.       She denies history of taking thyroid medications.        Ref. Range 12/15/2021 06:38   TSH Latest Ref Range: 0.380 - 5.330 uIU/mL 1.960   T3 Latest Ref Range: 60.0 - 181.0 ng/dL 107.0       2.  Vitamin D deficiency:  Currently taking 5000IUs daily    Ref. Range 12/15/2021 06:38   25-Hydroxy   Vitamin D 25 Latest Ref Range: 30 - 80 ng/mL 37       Patient's medications, allergies, and social histories were reviewed and updated as appropriate.      ROS:      CONS:     No fever, no chills, no weight loss, no fatigue   EYES:      No diplopia, no blurry vision, no redness of eyes, no swelling of eyelids   ENT:    No hearing loss, No ear pain, No sore throat, no dysphagia, no neck swelling   CV:     No chest pain, no palpitations, no claudication, no orthopnea, no PND   PULM:    No SOB, no cough, no hemoptysis, no wheezing    GI:   No nausea, no vomiting, no diarrhea, no constipation, no bloody stools   :  Passing urine well, no dysuria, no hematuria   ENDO:   No polyuria, no polydipsia, no heat intolerance, no cold intolerance   NEURO: No  headaches, no dizziness, no convulsions, no tremors   MUSC:  No joint swellings, no arthralgias, no myalgias, no weakness   SKIN:   No rash, no ulcers, no dry skin   PSYCH:   No depression, no anxiety, no difficulty sleeping       Past Medical History:  Patient Active Problem List    Diagnosis Date Noted   • Low vitamin D level 2021   • Hypercalcemia 2020   • Diabetes mellitus (HCC) 2020   • Erythrocytosis 2020   • Hyperlipidemia 2019       Past Surgical History:  Past Surgical History:   Procedure Laterality Date   • HYSTERECTOMY, TOTAL ABDOMINAL      ovaries remain; for endometriosis    • TONSILLECTOMY          Allergies:  Patient has no known allergies.     Current Medications:    Current Outpatient Medications:   •  atorvastatin (LIPITOR) 20 MG Tab, Take 1 Tablet by mouth every day., Disp: 90 Tablet, Rfl: 0  •  Lancets, Per insurance coverage. Check blood sugar up to 2-3x per day and prn ssx of high or low sugar, Disp: 100 Each, Rfl: 3  •  Alcohol Swabs, Per insurance coverage. Wipe site with prep pad prior to injection, Disp: 100 Each, Rfl: 3  •  ACCU-CHEK GUIDE strip, CHECK BLOOD SUGAR UP TO 2-3X PER DAY AND AS NEEDED FOR HIGH OR LOW SUGAR, Disp: 300 Strip, Rfl: 3  •  Blood Glucose Meter Kit, Per insurance coverage. Check blood sugar up to 2-3x per day and prn ssx of high or low sugar, Disp: 1 Kit, Rfl: 0    Social History:  Social History     Socioeconomic History   • Marital status: Single     Spouse name: Not on file   • Number of children: Not on file   • Years of education: Not on file   • Highest education level: Not on file   Occupational History   • Not on file   Tobacco Use   • Smoking status: Former Smoker     Packs/day: 1.00     Years: 20.00     Pack years: 20.00     Quit date:      Years since quittin.2   • Smokeless tobacco: Never Used   Vaping Use   • Vaping Use: Former   Substance and Sexual Activity   • Alcohol use: Yes     Comment: Occ   • Drug use: Yes  "    Types: Marijuana     Comment: Gummy   • Sexual activity: Not on file   Other Topics Concern   • Not on file   Social History Narrative    /      Social Determinants of Health     Financial Resource Strain: Not on file   Food Insecurity: Not on file   Transportation Needs: Not on file   Physical Activity: Not on file   Stress: Not on file   Social Connections: Not on file   Intimate Partner Violence: Not on file   Housing Stability: Not on file        Family History:   Family History   Problem Relation Age of Onset   • Stroke Mother    • Heart Disease Mother    • Lung Disease Father         COPD, smoker    • Heart Disease Maternal Grandmother    • Cancer Paternal Grandmother    • Cancer Paternal Grandfather          PHYSICAL EXAM:   Vital signs: /76 (BP Location: Left arm, Patient Position: Sitting, BP Cuff Size: Adult)   Pulse 98   Ht 1.727 m (5' 8\")   Wt 85.5 kg (188 lb 6.4 oz)   SpO2 98%   BMI 28.65 kg/m²   GENERAL: Well-developed, well-nourished  in no apparent distress.   EYE: No ocular and eyelid asymmetry, Anicteric sclerae,  PERRL, No exophthalmos or lidlag  HENT: Hearing grossly intact, Normocephalic, atraumatic.   NECK: Supple. Trachea midline. thyroid enlarged, Goiter towards R side, smooth, nontender, no nodules  CARDIOVASCULAR: Regular rate and rhythm. No murmurs, rubs, or gallops.   LUNGS: Clear to auscultation bilaterally   EXTREMITIES: No clubbing, cyanosis, or edema.   NEUROLOGICAL: Cranial nerves II-XII are grossly intact   Symmetric reflexes at the patella no proximal muscle weakness, No visible tremor with both outstretched hands  LYMPH: No cervical, supraclavicular,  adenopathy palpated.   SKIN: No rashes, lesions. Turgor is normal.    Labs:  Lab Results   Component Value Date/Time    WBC 7.2 12/15/2021 06:38 AM    RBC 5.45 (H) 12/15/2021 06:38 AM    HEMOGLOBIN 16.6 (H) 12/15/2021 06:38 AM    MCV 90.5 12/15/2021 06:38 AM    MCH 30.5 12/15/2021 " 06:38 AM    MCHC 33.7 12/15/2021 06:38 AM    RDW 42.8 12/15/2021 06:38 AM    MPV 10.2 12/15/2021 06:38 AM       Lab Results   Component Value Date/Time    SODIUM 142 12/15/2021 06:38 AM    POTASSIUM 4.2 12/15/2021 06:38 AM    CHLORIDE 104 12/15/2021 06:38 AM    CO2 25 12/15/2021 06:38 AM    ANION 13.0 12/15/2021 06:38 AM    GLUCOSE 122 (H) 12/15/2021 06:38 AM    BUN 18 12/15/2021 06:38 AM    CREATININE 0.95 12/15/2021 06:38 AM    CALCIUM 10.3 12/15/2021 06:38 AM    ASTSGOT 23 12/15/2021 06:38 AM    ALTSGPT 87 (H) 12/15/2021 06:38 AM    TBILIRUBIN 0.3 12/15/2021 06:38 AM    ALBUMIN 4.7 12/15/2021 06:38 AM    TOTPROTEIN 7.8 12/15/2021 06:38 AM    GLOBULIN 3.1 12/15/2021 06:38 AM    AGRATIO 1.5 12/15/2021 06:38 AM       Lab Results   Component Value Date/Time    TSHULTRASEN 1.960 12/15/2021 0638     Lab Results   Component Value Date/Time    FREET4 0.82 10/28/2014 0630     Imaging:        ASSESSMENT/PLAN:   1. Thyroid nodule  Unstable  See HPI  I will order thyroid hormone levels including antibodies for Hashimoto's Disease    Please do the following blood work 1 week after stopping biotin  - THYROID PEROXIDASE  (TPO) AB; Future  - ANTITHYROGLOBULIN AB; Future  - TSH; Future  - FREE THYROXINE; Future    Please schedule biopsy as soon as possible  - US-NEEDLE BX-THYROID    2. Vitamin D deficiency  Unstable  Currently taking Vitamin D 5000IUs OTC after blood work done in Dec 2021  We will evaluate levels around June, 2022    Disposition: Please make an appt in 4 weeks to review lab results and biopsy results.         Thank you kindly for allowing me to participate in the thyroid care plan for this patient.      Ruddy Beckwith, NIYAH.P.R.N.  03/21/22    CC:   Claudia Lynne M.D.

## 2022-03-23 DIAGNOSIS — E04.1 THYROID NODULE: ICD-10-CM

## 2022-03-30 ENCOUNTER — HOSPITAL ENCOUNTER (OUTPATIENT)
Dept: RADIOLOGY | Facility: MEDICAL CENTER | Age: 62
End: 2022-03-30
Payer: COMMERCIAL

## 2022-03-30 DIAGNOSIS — E04.1 THYROID NODULE: ICD-10-CM

## 2022-03-30 PROCEDURE — 76604 US EXAM CHEST: CPT

## 2022-03-30 RX ORDER — LIDOCAINE HYDROCHLORIDE 10 MG/ML
INJECTION, SOLUTION EPIDURAL; INFILTRATION; INTRACAUDAL; PERINEURAL
Status: DISCONTINUED
Start: 2022-03-30 | End: 2022-03-30

## 2022-04-02 ENCOUNTER — HOSPITAL ENCOUNTER (OUTPATIENT)
Dept: LAB | Facility: MEDICAL CENTER | Age: 62
End: 2022-04-02
Payer: COMMERCIAL

## 2022-04-02 DIAGNOSIS — E04.1 THYROID NODULE: ICD-10-CM

## 2022-04-02 LAB
T4 FREE SERPL-MCNC: 1.52 NG/DL (ref 0.93–1.7)
THYROPEROXIDASE AB SERPL-ACNC: 105 IU/ML (ref 0–9)
TSH SERPL DL<=0.005 MIU/L-ACNC: 0.64 UIU/ML (ref 0.38–5.33)

## 2022-04-02 PROCEDURE — 84443 ASSAY THYROID STIM HORMONE: CPT

## 2022-04-02 PROCEDURE — 84439 ASSAY OF FREE THYROXINE: CPT

## 2022-04-02 PROCEDURE — 86376 MICROSOMAL ANTIBODY EACH: CPT

## 2022-04-02 PROCEDURE — 86800 THYROGLOBULIN ANTIBODY: CPT

## 2022-04-02 PROCEDURE — 36415 COLL VENOUS BLD VENIPUNCTURE: CPT

## 2022-04-06 LAB — THYROGLOB AB SERPL-ACNC: <0.9 IU/ML (ref 0–4)

## 2022-04-12 ENCOUNTER — OFFICE VISIT (OUTPATIENT)
Dept: ENDOCRINOLOGY | Facility: MEDICAL CENTER | Age: 62
End: 2022-04-12
Payer: COMMERCIAL

## 2022-04-12 VITALS
DIASTOLIC BLOOD PRESSURE: 72 MMHG | SYSTOLIC BLOOD PRESSURE: 122 MMHG | BODY MASS INDEX: 27.68 KG/M2 | WEIGHT: 182.6 LBS | HEART RATE: 94 BPM | OXYGEN SATURATION: 100 % | HEIGHT: 68 IN

## 2022-04-12 DIAGNOSIS — E06.3 HASHIMOTO'S DISEASE: ICD-10-CM

## 2022-04-12 DIAGNOSIS — E04.1 THYROID NODULE: ICD-10-CM

## 2022-04-12 DIAGNOSIS — E55.9 VITAMIN D DEFICIENCY: ICD-10-CM

## 2022-04-12 PROCEDURE — 99214 OFFICE O/P EST MOD 30 MIN: CPT

## 2022-04-12 PROCEDURE — 99211 OFF/OP EST MAY X REQ PHY/QHP: CPT

## 2022-04-12 ASSESSMENT — FIBROSIS 4 INDEX: FIB4 SCORE: 0.65

## 2022-04-12 NOTE — PROGRESS NOTES
Chief Complaint: Follow-up of Thyroid Nodule    HPI:   1. Thyroid Nodule:   Jessica Bishop is a 62 y.o. female with history of Thyroid Nodule diagnosed December 2021and is here for follow-up    Thyroid US from December 3, 2021 was reviewed and discussed with the patient in detail.    This showed a 1.03 cm thyroid nodule on the right lateral thyroid lobe, solid, isoechoic, taller than wide, TR 5  Thyroid gland was heterogeneous with normal vascularity    She denies prior thyroid biopsy and denies prior thyroid surgery.    She denies lumps or enlargement in the neck.   Denies hoarseness, dysphagia, dyspnea, anterior neck pain and anterior neck swelling.     She reports a family history of goiter.    She denies a history of radiation exposure to head or neck.    She reports fatigue related to sleeping later at night, weight gain related to food, sweating     She denies history of taking thyroid medications.     She went for a biopsy on 3/30/2022 but he was not done by radiology tech, patient was told that thyroid nodule was too small and could not be found     Ref. Range 12/15/2021 06:38   TSH Latest Ref Range: 0.380 - 5.330 uIU/mL 1.960   T3 Latest Ref Range: 60.0 - 181.0 ng/dL 107.0     3.  Hashimoto's disease:  Elevated antibody levels   Ref. Range 4/2/2022 11:10   Microsomal -Tpo- Abs Latest Ref Range: 0.0 - 9.0 IU/mL 105.0 (H)   Anti-Thyroglobulin Latest Ref Range: 0.0 - 4.0 IU/mL <0.9       3.  Vitamin D deficiency:  Currently taking 5000IUs daily    Ref. Range 12/15/2021 06:38   25-Hydroxy   Vitamin D 25 Latest Ref Range: 30 - 80 ng/mL 37       Patient's medications, allergies, and social histories were reviewed and updated as appropriate.      ROS:      CONS:     No fever, no chills, no weight loss, no fatigue   EYES:      No diplopia, no blurry vision, no redness of eyes, no swelling of eyelids   ENT:    No hearing loss, No ear pain, No sore throat, no dysphagia, no neck swelling   CV:     No chest pain, no  palpitations, no claudication, no orthopnea, no PND   PULM:    No SOB, no cough, no hemoptysis, no wheezing    GI:   No nausea, no vomiting, no diarrhea, no constipation, no bloody stools   :  Passing urine well, no dysuria, no hematuria   ENDO:   No polyuria, no polydipsia, no heat intolerance, no cold intolerance   NEURO: No headaches, no dizziness, no convulsions, no tremors   MUSC:  No joint swellings, no arthralgias, no myalgias, no weakness   SKIN:   No rash, no ulcers, no dry skin   PSYCH:   No depression, no anxiety, no difficulty sleeping       Past Medical History:  Patient Active Problem List    Diagnosis Date Noted   • Low vitamin D level 12/27/2021   • Hypercalcemia 12/04/2020   • Diabetes mellitus (HCC) 11/05/2020   • Erythrocytosis 11/05/2020   • Hyperlipidemia 04/11/2019       Past Surgical History:  Past Surgical History:   Procedure Laterality Date   • HYSTERECTOMY, TOTAL ABDOMINAL      ovaries remain; for endometriosis    • TONSILLECTOMY          Allergies:  Patient has no known allergies.     Current Medications:    Current Outpatient Medications:   •  atorvastatin (LIPITOR) 20 MG Tab, Take 1 Tablet by mouth every day., Disp: 90 Tablet, Rfl: 0  •  Lancets, Per insurance coverage. Check blood sugar up to 2-3x per day and prn ssx of high or low sugar, Disp: 100 Each, Rfl: 3  •  Alcohol Swabs, Per insurance coverage. Wipe site with prep pad prior to injection, Disp: 100 Each, Rfl: 3  •  ACCU-CHEK GUIDE strip, CHECK BLOOD SUGAR UP TO 2-3X PER DAY AND AS NEEDED FOR HIGH OR LOW SUGAR, Disp: 300 Strip, Rfl: 3  •  Blood Glucose Meter Kit, Per insurance coverage. Check blood sugar up to 2-3x per day and prn ssx of high or low sugar, Disp: 1 Kit, Rfl: 0    Social History:  Social History     Socioeconomic History   • Marital status: Single     Spouse name: Not on file   • Number of children: Not on file   • Years of education: Not on file   • Highest education level: Not on file   Occupational History  "  • Not on file   Tobacco Use   • Smoking status: Former Smoker     Packs/day: 1.00     Years: 20.00     Pack years: 20.00     Quit date:      Years since quittin.2   • Smokeless tobacco: Never Used   Vaping Use   • Vaping Use: Former   Substance and Sexual Activity   • Alcohol use: Yes     Comment: Occ   • Drug use: Yes     Types: Marijuana     Comment: Gummy   • Sexual activity: Not on file   Other Topics Concern   • Not on file   Social History Narrative    /      Social Determinants of Health     Financial Resource Strain: Not on file   Food Insecurity: Not on file   Transportation Needs: Not on file   Physical Activity: Not on file   Stress: Not on file   Social Connections: Not on file   Intimate Partner Violence: Not on file   Housing Stability: Not on file        Family History:   Family History   Problem Relation Age of Onset   • Stroke Mother    • Heart Disease Mother    • Lung Disease Father         COPD, smoker    • Heart Disease Maternal Grandmother    • Cancer Paternal Grandmother    • Cancer Paternal Grandfather          PHYSICAL EXAM:   Vital signs: /72 (BP Location: Left arm, Patient Position: Sitting, BP Cuff Size: Large adult)   Pulse 94   Ht 1.727 m (5' 8\")   Wt 82.8 kg (182 lb 9.6 oz)   SpO2 100%   BMI 27.76 kg/m²   GENERAL: Well-developed, well-nourished  in no apparent distress.   EYE: No ocular and eyelid asymmetry, Anicteric sclerae,    HENT: Hearing grossly intact, Normocephalic, atraumatic.   NECK: Supple. Trachea midline. thyroid enlarged, Goiter towards R side, smooth, nontender, no nodules  CARDIOVASCULAR: Regular rate and rhythm. No murmurs, rubs, or gallops.   LUNGS: Clear to auscultation bilaterally   EXTREMITIES: No clubbing, cyanosis, or edema.   NEUROLOGICAL:  No visible tremor with both outstretched hands  LYMPH: No cervical, supraclavicular,  adenopathy palpated.   SKIN: No rashes, lesions. Turgor is normal.    Labs:  Lab " Results   Component Value Date/Time    WBC 7.2 12/15/2021 06:38 AM    RBC 5.45 (H) 12/15/2021 06:38 AM    HEMOGLOBIN 16.6 (H) 12/15/2021 06:38 AM    MCV 90.5 12/15/2021 06:38 AM    MCH 30.5 12/15/2021 06:38 AM    MCHC 33.7 12/15/2021 06:38 AM    RDW 42.8 12/15/2021 06:38 AM    MPV 10.2 12/15/2021 06:38 AM       Lab Results   Component Value Date/Time    SODIUM 142 12/15/2021 06:38 AM    POTASSIUM 4.2 12/15/2021 06:38 AM    CHLORIDE 104 12/15/2021 06:38 AM    CO2 25 12/15/2021 06:38 AM    ANION 13.0 12/15/2021 06:38 AM    GLUCOSE 122 (H) 12/15/2021 06:38 AM    BUN 18 12/15/2021 06:38 AM    CREATININE 0.95 12/15/2021 06:38 AM    CALCIUM 10.3 12/15/2021 06:38 AM    ASTSGOT 23 12/15/2021 06:38 AM    ALTSGPT 87 (H) 12/15/2021 06:38 AM    TBILIRUBIN 0.3 12/15/2021 06:38 AM    ALBUMIN 4.7 12/15/2021 06:38 AM    TOTPROTEIN 7.8 12/15/2021 06:38 AM    GLOBULIN 3.1 12/15/2021 06:38 AM    AGRATIO 1.5 12/15/2021 06:38 AM       Lab Results   Component Value Date/Time    TSHULTRASEN 1.960 12/15/2021 0638     Lab Results   Component Value Date/Time    FREET4 0.82 10/28/2014 0630     Imaging:        ASSESSMENT/PLAN:   1. Thyroid nodule  Stable    FNA biopsy could not be done by ultrasound tech or stating that he could not stand the nodule    We discussed pathophysiology of thyroid nodules by 2 with ultrasounds and follow them closely  We will repeat ultrasound in June or July of this year to evaluate any nodular changes    Please do the following blood work before your next appointment in a schedule ultrasound around June or July.  We will determine then how often patient needs to be follow-up for  - TSH; Future  - FREE THYROXINE; Future  - US-THYROID; Future  - Comp Metabolic Panel; Future    2. Hashimoto's disease  Elevated antibody levels    3. Vitamin D deficiency  Unstable  Continue regimen  - VITAMIN D,25 HYDROXY; Future    Disposition: Please make an appt in at the end of July after ultrasound is done, do your blood work  before your next appointment      Thank you kindly for allowing me to participate in the thyroid care plan for this patient.      YULISSA BradshawPVimalRVimalN.  03/21/22    CC:   Claudia Lynne M.D.

## 2022-04-15 NOTE — TELEPHONE ENCOUNTER
Patient requests change in pharmacy.     [facility] - [place], [place] - [address] [place] [id number]  Phone: [phone] Fax: [phone]

## 2022-04-17 RX ORDER — LANCETS 30 GAUGE
EACH MISCELLANEOUS
Qty: 100 EACH | Refills: 1 | Status: SHIPPED | OUTPATIENT
Start: 2022-04-17 | End: 2023-09-08

## 2022-04-20 NOTE — PROGRESS NOTES
Late entry 3/30    Patient to IR for possible thyroid biopsy.  Dr. Ross performed bedside ultrasound.  Per Dr. Ross lesion to small to safely biopsy.  Procedure not completed.  Patient ambulated out.

## 2022-04-25 DIAGNOSIS — E78.5 HYPERLIPIDEMIA, UNSPECIFIED HYPERLIPIDEMIA TYPE: ICD-10-CM

## 2022-06-11 ENCOUNTER — HOSPITAL ENCOUNTER (OUTPATIENT)
Dept: LAB | Facility: MEDICAL CENTER | Age: 62
End: 2022-06-11
Payer: COMMERCIAL

## 2022-06-11 ENCOUNTER — HOSPITAL ENCOUNTER (OUTPATIENT)
Dept: LAB | Facility: MEDICAL CENTER | Age: 62
End: 2022-06-11
Attending: FAMILY MEDICINE
Payer: COMMERCIAL

## 2022-06-11 DIAGNOSIS — E78.5 HYPERLIPIDEMIA, UNSPECIFIED HYPERLIPIDEMIA TYPE: ICD-10-CM

## 2022-06-11 DIAGNOSIS — E04.1 THYROID NODULE: ICD-10-CM

## 2022-06-11 DIAGNOSIS — E55.9 VITAMIN D DEFICIENCY: ICD-10-CM

## 2022-06-11 LAB
25(OH)D3 SERPL-MCNC: 79 NG/ML (ref 30–100)
ALBUMIN SERPL BCP-MCNC: 4.6 G/DL (ref 3.2–4.9)
ALBUMIN/GLOB SERPL: 1.5 G/DL
ALP SERPL-CCNC: 82 U/L (ref 30–99)
ALT SERPL-CCNC: 30 U/L (ref 2–50)
ANION GAP SERPL CALC-SCNC: 13 MMOL/L (ref 7–16)
AST SERPL-CCNC: 19 U/L (ref 12–45)
BILIRUB SERPL-MCNC: 0.3 MG/DL (ref 0.1–1.5)
BUN SERPL-MCNC: 21 MG/DL (ref 8–22)
CALCIUM SERPL-MCNC: 9.7 MG/DL (ref 8.5–10.5)
CHLORIDE SERPL-SCNC: 104 MMOL/L (ref 96–112)
CHOLEST SERPL-MCNC: 125 MG/DL (ref 100–199)
CO2 SERPL-SCNC: 23 MMOL/L (ref 20–33)
CREAT SERPL-MCNC: 1.05 MG/DL (ref 0.5–1.4)
FASTING STATUS PATIENT QL REPORTED: NORMAL
FASTING STATUS PATIENT QL REPORTED: NORMAL
GFR SERPLBLD CREATININE-BSD FMLA CKD-EPI: 60 ML/MIN/1.73 M 2
GLOBULIN SER CALC-MCNC: 3.1 G/DL (ref 1.9–3.5)
GLUCOSE SERPL-MCNC: 125 MG/DL (ref 65–99)
HDLC SERPL-MCNC: 35 MG/DL
LDLC SERPL CALC-MCNC: 65 MG/DL
POTASSIUM SERPL-SCNC: 4.4 MMOL/L (ref 3.6–5.5)
PROT SERPL-MCNC: 7.7 G/DL (ref 6–8.2)
SODIUM SERPL-SCNC: 140 MMOL/L (ref 135–145)
T4 FREE SERPL-MCNC: 1.37 NG/DL (ref 0.93–1.7)
TRIGL SERPL-MCNC: 123 MG/DL (ref 0–149)
TSH SERPL DL<=0.005 MIU/L-ACNC: 1.44 UIU/ML (ref 0.38–5.33)

## 2022-06-11 PROCEDURE — 82306 VITAMIN D 25 HYDROXY: CPT

## 2022-06-11 PROCEDURE — 80061 LIPID PANEL: CPT

## 2022-06-11 PROCEDURE — 36415 COLL VENOUS BLD VENIPUNCTURE: CPT

## 2022-06-11 PROCEDURE — 84439 ASSAY OF FREE THYROXINE: CPT

## 2022-06-11 PROCEDURE — 80053 COMPREHEN METABOLIC PANEL: CPT

## 2022-06-11 PROCEDURE — 84443 ASSAY THYROID STIM HORMONE: CPT

## 2022-06-22 RX ORDER — ATORVASTATIN CALCIUM 20 MG/1
20 TABLET, FILM COATED ORAL DAILY
Qty: 90 TABLET | Refills: 1 | Status: SHIPPED | OUTPATIENT
Start: 2022-06-22 | End: 2023-02-14

## 2022-07-05 ENCOUNTER — HOSPITAL ENCOUNTER (OUTPATIENT)
Dept: RADIOLOGY | Facility: MEDICAL CENTER | Age: 62
End: 2022-07-05
Payer: COMMERCIAL

## 2022-07-05 DIAGNOSIS — E04.1 THYROID NODULE: ICD-10-CM

## 2022-07-05 PROCEDURE — 76536 US EXAM OF HEAD AND NECK: CPT

## 2023-02-15 DIAGNOSIS — E78.5 HYPERLIPIDEMIA, UNSPECIFIED HYPERLIPIDEMIA TYPE: ICD-10-CM

## 2023-02-15 DIAGNOSIS — D75.1 ERYTHROCYTOSIS: Chronic | ICD-10-CM

## 2023-02-15 DIAGNOSIS — R79.89 LOW VITAMIN D LEVEL: ICD-10-CM

## 2023-02-15 DIAGNOSIS — E11.9 TYPE 2 DIABETES MELLITUS WITHOUT COMPLICATION, WITHOUT LONG-TERM CURRENT USE OF INSULIN (HCC): Chronic | ICD-10-CM

## 2023-02-15 DIAGNOSIS — R79.89 ABNORMAL THYROID BLOOD TEST: ICD-10-CM

## 2023-02-15 DIAGNOSIS — E55.9 VITAMIN D DEFICIENCY: ICD-10-CM

## 2023-03-13 ENCOUNTER — HOSPITAL ENCOUNTER (OUTPATIENT)
Dept: LAB | Facility: MEDICAL CENTER | Age: 63
End: 2023-03-13
Attending: FAMILY MEDICINE
Payer: COMMERCIAL

## 2023-03-13 DIAGNOSIS — D75.1 ERYTHROCYTOSIS: Chronic | ICD-10-CM

## 2023-03-13 DIAGNOSIS — E55.9 VITAMIN D DEFICIENCY: ICD-10-CM

## 2023-03-13 DIAGNOSIS — E11.9 TYPE 2 DIABETES MELLITUS WITHOUT COMPLICATION, WITHOUT LONG-TERM CURRENT USE OF INSULIN (HCC): Chronic | ICD-10-CM

## 2023-03-13 DIAGNOSIS — R79.89 ABNORMAL THYROID BLOOD TEST: ICD-10-CM

## 2023-03-13 DIAGNOSIS — E78.5 HYPERLIPIDEMIA, UNSPECIFIED HYPERLIPIDEMIA TYPE: ICD-10-CM

## 2023-03-13 LAB
ALBUMIN SERPL BCP-MCNC: 4.9 G/DL (ref 3.2–4.9)
ALBUMIN/GLOB SERPL: 1.6 G/DL
ALP SERPL-CCNC: 70 U/L (ref 30–99)
ALT SERPL-CCNC: 30 U/L (ref 2–50)
ANION GAP SERPL CALC-SCNC: 12 MMOL/L (ref 7–16)
AST SERPL-CCNC: 12 U/L (ref 12–45)
BASOPHILS # BLD AUTO: 0.6 % (ref 0–1.8)
BASOPHILS # BLD: 0.04 K/UL (ref 0–0.12)
BILIRUB SERPL-MCNC: 0.4 MG/DL (ref 0.1–1.5)
BUN SERPL-MCNC: 21 MG/DL (ref 8–22)
CALCIUM ALBUM COR SERPL-MCNC: 9.4 MG/DL (ref 8.5–10.5)
CALCIUM SERPL-MCNC: 10.1 MG/DL (ref 8.5–10.5)
CHLORIDE SERPL-SCNC: 108 MMOL/L (ref 96–112)
CHOLEST SERPL-MCNC: 134 MG/DL (ref 100–199)
CO2 SERPL-SCNC: 21 MMOL/L (ref 20–33)
CREAT SERPL-MCNC: 0.91 MG/DL (ref 0.5–1.4)
CREAT UR-MCNC: 169 MG/DL
EOSINOPHIL # BLD AUTO: 0.18 K/UL (ref 0–0.51)
EOSINOPHIL NFR BLD: 2.5 % (ref 0–6.9)
ERYTHROCYTE [DISTWIDTH] IN BLOOD BY AUTOMATED COUNT: 44.2 FL (ref 35.9–50)
EST. AVERAGE GLUCOSE BLD GHB EST-MCNC: 126 MG/DL
FASTING STATUS PATIENT QL REPORTED: NORMAL
GFR SERPLBLD CREATININE-BSD FMLA CKD-EPI: 71 ML/MIN/1.73 M 2
GLOBULIN SER CALC-MCNC: 3 G/DL (ref 1.9–3.5)
GLUCOSE SERPL-MCNC: 119 MG/DL (ref 65–99)
HBA1C MFR BLD: 6 % (ref 4–5.6)
HCT VFR BLD AUTO: 49.1 % (ref 37–47)
HDLC SERPL-MCNC: 35 MG/DL
HGB BLD-MCNC: 16.4 G/DL (ref 12–16)
IMM GRANULOCYTES # BLD AUTO: 0.03 K/UL (ref 0–0.11)
IMM GRANULOCYTES NFR BLD AUTO: 0.4 % (ref 0–0.9)
LDLC SERPL CALC-MCNC: 66 MG/DL
LYMPHOCYTES # BLD AUTO: 1.6 K/UL (ref 1–4.8)
LYMPHOCYTES NFR BLD: 22.4 % (ref 22–41)
MCH RBC QN AUTO: 30.5 PG (ref 27–33)
MCHC RBC AUTO-ENTMCNC: 33.4 G/DL (ref 33.6–35)
MCV RBC AUTO: 91.3 FL (ref 81.4–97.8)
MICROALBUMIN UR-MCNC: 1.6 MG/DL
MICROALBUMIN/CREAT UR: 9 MG/G (ref 0–30)
MONOCYTES # BLD AUTO: 0.63 K/UL (ref 0–0.85)
MONOCYTES NFR BLD AUTO: 8.8 % (ref 0–13.4)
NEUTROPHILS # BLD AUTO: 4.65 K/UL (ref 2–7.15)
NEUTROPHILS NFR BLD: 65.3 % (ref 44–72)
NRBC # BLD AUTO: 0 K/UL
NRBC BLD-RTO: 0 /100 WBC
PLATELET # BLD AUTO: 230 K/UL (ref 164–446)
PMV BLD AUTO: 10.7 FL (ref 9–12.9)
POTASSIUM SERPL-SCNC: 4.6 MMOL/L (ref 3.6–5.5)
PROT SERPL-MCNC: 7.9 G/DL (ref 6–8.2)
RBC # BLD AUTO: 5.38 M/UL (ref 4.2–5.4)
SODIUM SERPL-SCNC: 141 MMOL/L (ref 135–145)
TRIGL SERPL-MCNC: 166 MG/DL (ref 0–149)
WBC # BLD AUTO: 7.1 K/UL (ref 4.8–10.8)

## 2023-03-13 PROCEDURE — 82306 VITAMIN D 25 HYDROXY: CPT

## 2023-03-13 PROCEDURE — 85025 COMPLETE CBC W/AUTO DIFF WBC: CPT

## 2023-03-13 PROCEDURE — 36415 COLL VENOUS BLD VENIPUNCTURE: CPT

## 2023-03-13 PROCEDURE — 80061 LIPID PANEL: CPT

## 2023-03-13 PROCEDURE — 86376 MICROSOMAL ANTIBODY EACH: CPT

## 2023-03-13 PROCEDURE — 80053 COMPREHEN METABOLIC PANEL: CPT

## 2023-03-13 PROCEDURE — 84443 ASSAY THYROID STIM HORMONE: CPT

## 2023-03-13 PROCEDURE — 83036 HEMOGLOBIN GLYCOSYLATED A1C: CPT

## 2023-03-13 PROCEDURE — 82043 UR ALBUMIN QUANTITATIVE: CPT

## 2023-03-13 PROCEDURE — 82570 ASSAY OF URINE CREATININE: CPT

## 2023-03-14 LAB
25(OH)D3 SERPL-MCNC: 57 NG/ML (ref 30–100)
THYROPEROXIDASE AB SERPL-ACNC: 128 IU/ML (ref 0–9)
TSH SERPL DL<=0.005 MIU/L-ACNC: 0.85 UIU/ML (ref 0.38–5.33)

## 2023-03-28 ENCOUNTER — HOSPITAL ENCOUNTER (OUTPATIENT)
Dept: RADIOLOGY | Facility: MEDICAL CENTER | Age: 63
End: 2023-03-28
Attending: FAMILY MEDICINE
Payer: COMMERCIAL

## 2023-03-28 DIAGNOSIS — Z12.31 VISIT FOR SCREENING MAMMOGRAM: ICD-10-CM

## 2023-03-28 PROCEDURE — 77063 BREAST TOMOSYNTHESIS BI: CPT

## 2023-04-26 ENCOUNTER — OFFICE VISIT (OUTPATIENT)
Dept: MEDICAL GROUP | Facility: PHYSICIAN GROUP | Age: 63
End: 2023-04-26
Payer: COMMERCIAL

## 2023-04-26 VITALS
HEIGHT: 68 IN | TEMPERATURE: 97.8 F | HEART RATE: 86 BPM | OXYGEN SATURATION: 97 % | SYSTOLIC BLOOD PRESSURE: 126 MMHG | BODY MASS INDEX: 27.61 KG/M2 | WEIGHT: 182.2 LBS | RESPIRATION RATE: 18 BRPM | DIASTOLIC BLOOD PRESSURE: 74 MMHG

## 2023-04-26 DIAGNOSIS — E04.1 THYROID NODULE: ICD-10-CM

## 2023-04-26 DIAGNOSIS — R73.03 PREDIABETES: ICD-10-CM

## 2023-04-26 DIAGNOSIS — E78.5 HYPERLIPIDEMIA, UNSPECIFIED HYPERLIPIDEMIA TYPE: ICD-10-CM

## 2023-04-26 PROCEDURE — 99214 OFFICE O/P EST MOD 30 MIN: CPT | Performed by: FAMILY MEDICINE

## 2023-04-26 RX ORDER — ATORVASTATIN CALCIUM 20 MG/1
20 TABLET, FILM COATED ORAL DAILY
Qty: 90 TABLET | Refills: 1 | Status: SHIPPED | OUTPATIENT
Start: 2023-04-26 | End: 2023-10-31

## 2023-04-26 SDOH — ECONOMIC STABILITY: HOUSING INSECURITY
IN THE LAST 12 MONTHS, WAS THERE A TIME WHEN YOU DID NOT HAVE A STEADY PLACE TO SLEEP OR SLEPT IN A SHELTER (INCLUDING NOW)?: NO

## 2023-04-26 SDOH — HEALTH STABILITY: PHYSICAL HEALTH: ON AVERAGE, HOW MANY MINUTES DO YOU ENGAGE IN EXERCISE AT THIS LEVEL?: 30 MIN

## 2023-04-26 SDOH — HEALTH STABILITY: PHYSICAL HEALTH: ON AVERAGE, HOW MANY DAYS PER WEEK DO YOU ENGAGE IN MODERATE TO STRENUOUS EXERCISE (LIKE A BRISK WALK)?: 3 DAYS

## 2023-04-26 SDOH — ECONOMIC STABILITY: FOOD INSECURITY: WITHIN THE PAST 12 MONTHS, YOU WORRIED THAT YOUR FOOD WOULD RUN OUT BEFORE YOU GOT MONEY TO BUY MORE.: NEVER TRUE

## 2023-04-26 SDOH — ECONOMIC STABILITY: TRANSPORTATION INSECURITY
IN THE PAST 12 MONTHS, HAS THE LACK OF TRANSPORTATION KEPT YOU FROM MEDICAL APPOINTMENTS OR FROM GETTING MEDICATIONS?: NO

## 2023-04-26 SDOH — ECONOMIC STABILITY: HOUSING INSECURITY: IN THE LAST 12 MONTHS, HOW MANY PLACES HAVE YOU LIVED?: 1

## 2023-04-26 SDOH — ECONOMIC STABILITY: INCOME INSECURITY: IN THE LAST 12 MONTHS, WAS THERE A TIME WHEN YOU WERE NOT ABLE TO PAY THE MORTGAGE OR RENT ON TIME?: NO

## 2023-04-26 SDOH — ECONOMIC STABILITY: TRANSPORTATION INSECURITY
IN THE PAST 12 MONTHS, HAS LACK OF TRANSPORTATION KEPT YOU FROM MEETINGS, WORK, OR FROM GETTING THINGS NEEDED FOR DAILY LIVING?: NO

## 2023-04-26 SDOH — ECONOMIC STABILITY: FOOD INSECURITY: WITHIN THE PAST 12 MONTHS, THE FOOD YOU BOUGHT JUST DIDN'T LAST AND YOU DIDN'T HAVE MONEY TO GET MORE.: NEVER TRUE

## 2023-04-26 SDOH — ECONOMIC STABILITY: INCOME INSECURITY: HOW HARD IS IT FOR YOU TO PAY FOR THE VERY BASICS LIKE FOOD, HOUSING, MEDICAL CARE, AND HEATING?: SOMEWHAT HARD

## 2023-04-26 SDOH — HEALTH STABILITY: MENTAL HEALTH
STRESS IS WHEN SOMEONE FEELS TENSE, NERVOUS, ANXIOUS, OR CAN'T SLEEP AT NIGHT BECAUSE THEIR MIND IS TROUBLED. HOW STRESSED ARE YOU?: TO SOME EXTENT

## 2023-04-26 SDOH — ECONOMIC STABILITY: TRANSPORTATION INSECURITY
IN THE PAST 12 MONTHS, HAS LACK OF RELIABLE TRANSPORTATION KEPT YOU FROM MEDICAL APPOINTMENTS, MEETINGS, WORK OR FROM GETTING THINGS NEEDED FOR DAILY LIVING?: NO

## 2023-04-26 ASSESSMENT — LIFESTYLE VARIABLES
SKIP TO QUESTIONS 9-10: 1
AUDIT-C TOTAL SCORE: 0
HOW OFTEN DO YOU HAVE SIX OR MORE DRINKS ON ONE OCCASION: NEVER
HOW OFTEN DO YOU HAVE A DRINK CONTAINING ALCOHOL: NEVER
HOW MANY STANDARD DRINKS CONTAINING ALCOHOL DO YOU HAVE ON A TYPICAL DAY: PATIENT DOES NOT DRINK

## 2023-04-26 ASSESSMENT — SOCIAL DETERMINANTS OF HEALTH (SDOH)
HOW OFTEN DO YOU GET TOGETHER WITH FRIENDS OR RELATIVES?: NEVER
ARE YOU MARRIED, WIDOWED, DIVORCED, SEPARATED, NEVER MARRIED, OR LIVING WITH A PARTNER?: LIVING WITH PARTNER
ARE YOU MARRIED, WIDOWED, DIVORCED, SEPARATED, NEVER MARRIED, OR LIVING WITH A PARTNER?: LIVING WITH PARTNER
HOW OFTEN DO YOU ATTEND CHURCH OR RELIGIOUS SERVICES?: NEVER
HOW OFTEN DO YOU HAVE SIX OR MORE DRINKS ON ONE OCCASION: NEVER
DO YOU BELONG TO ANY CLUBS OR ORGANIZATIONS SUCH AS CHURCH GROUPS UNIONS, FRATERNAL OR ATHLETIC GROUPS, OR SCHOOL GROUPS?: NO
HOW HARD IS IT FOR YOU TO PAY FOR THE VERY BASICS LIKE FOOD, HOUSING, MEDICAL CARE, AND HEATING?: SOMEWHAT HARD
IN A TYPICAL WEEK, HOW MANY TIMES DO YOU TALK ON THE PHONE WITH FAMILY, FRIENDS, OR NEIGHBORS?: ONCE A WEEK
DO YOU BELONG TO ANY CLUBS OR ORGANIZATIONS SUCH AS CHURCH GROUPS UNIONS, FRATERNAL OR ATHLETIC GROUPS, OR SCHOOL GROUPS?: NO
HOW OFTEN DO YOU ATTENT MEETINGS OF THE CLUB OR ORGANIZATION YOU BELONG TO?: NEVER
WITHIN THE PAST 12 MONTHS, YOU WORRIED THAT YOUR FOOD WOULD RUN OUT BEFORE YOU GOT THE MONEY TO BUY MORE: NEVER TRUE
HOW OFTEN DO YOU GET TOGETHER WITH FRIENDS OR RELATIVES?: NEVER
HOW MANY DRINKS CONTAINING ALCOHOL DO YOU HAVE ON A TYPICAL DAY WHEN YOU ARE DRINKING: PATIENT DOES NOT DRINK
HOW OFTEN DO YOU HAVE A DRINK CONTAINING ALCOHOL: NEVER
HOW OFTEN DO YOU ATTENT MEETINGS OF THE CLUB OR ORGANIZATION YOU BELONG TO?: NEVER
IN A TYPICAL WEEK, HOW MANY TIMES DO YOU TALK ON THE PHONE WITH FAMILY, FRIENDS, OR NEIGHBORS?: ONCE A WEEK
HOW OFTEN DO YOU ATTEND CHURCH OR RELIGIOUS SERVICES?: NEVER

## 2023-04-26 ASSESSMENT — FIBROSIS 4 INDEX: FIB4 SCORE: 0.6

## 2023-04-26 ASSESSMENT — PATIENT HEALTH QUESTIONNAIRE - PHQ9: CLINICAL INTERPRETATION OF PHQ2 SCORE: 0

## 2023-04-26 NOTE — PROGRESS NOTES
Subjective:     CC:   Chief Complaint   Patient presents with    Follow-Up       HPI:   Jessica presents today for follow-up I have not seen her since December 2021.  At that time I did refer her to endocrine because she had a thyroid nodule.  Patient states that they think she has Hashimoto's she was scheduled to get a fine-needle aspiration done but the radiologist could not locate the nodule.  It looks like she had a repeat ultrasound done in 2022 by endocrine that shows the nodule still there its greater than 1 cm it did recommend consideration for fine-needle biopsy.  I would recommend patient follow-up with endocrine which she will.  Patient states that she has lost weight in is doing a lot better.    Past Medical History:   Diagnosis Date    Hyperlipidemia 4/11/2019       Social History     Tobacco Use    Smoking status: Former     Packs/day: 1.00     Years: 20.00     Pack years: 20.00     Types: Cigarettes     Quit date: 2008     Years since quitting: 15.3    Smokeless tobacco: Never   Vaping Use    Vaping Use: Former   Substance Use Topics    Alcohol use: Yes     Comment: Occ    Drug use: Yes     Types: Marijuana     Comment: Gummy       Current Outpatient Medications Ordered in Epic   Medication Sig Dispense Refill    atorvastatin (LIPITOR) 20 MG Tab TAKE 1 TABLET BY MOUTH EVERY DAY 90 Tablet 0    Lancets Per insurance coverage. Check blood sugar up to 2-3x per day and prn ssx of high or low sugar 100 Each 1    Alcohol Swabs Per insurance coverage. Wipe site with prep pad prior to injection 100 Each 3    ACCU-CHEK GUIDE strip CHECK BLOOD SUGAR UP TO 2-3X PER DAY AND AS NEEDED FOR HIGH OR LOW SUGAR 300 Strip 3    Blood Glucose Meter Kit Per insurance coverage. Check blood sugar up to 2-3x per day and prn ssx of high or low sugar 1 Kit 0     No current Epic-ordered facility-administered medications on file.       Allergies:  Patient has no known allergies.    Health Maintenance: Completed    ROS:  Gen: no  "fevers/chills, no changes in weight  Eyes: no changes in vision  ENT: no sore throat, no hearing loss, no bloody nose  Pulm: no sob, no cough  CV: no chest pain, no palpitations  GI: no nausea/vomiting, no diarrhea  : no dysuria  Neuro: no headaches, no numbness/tingling  Heme/Lymph: no easy bruising    Objective:     Exam:  /74 (BP Location: Left arm, Patient Position: Sitting, BP Cuff Size: Adult)   Pulse 86   Temp 36.6 °C (97.8 °F) (Temporal)   Resp 18   Ht 1.727 m (5' 8\")   Wt 82.6 kg (182 lb 3.2 oz)   SpO2 97%   BMI 27.70 kg/m²  Body mass index is 27.7 kg/m².    Gen: Alert and oriented, No apparent distress.  Skin: Warm and dry.  No obvious lesions.  Eyes: Sclera wnl Pupils normal in size  Lungs: Normal effort, CTA bilaterally, no wheezes, rhonchi, or rales  CV: Regular rate and rhythm. No murmurs, rubs, or gallops.  Musculoskeletal: Normal gait. No extremity cyanosis, clubbing, or edema.  Neuro: Oriented to person, place and time  Psych: Mood is wnl       Labs: Would recommend repeating her labs in about 4 months    Assessment & Plan:     63 y.o. female with the following -     1. Prediabetes  Patient's hemoglobin A1c is 6 we will continue to monitor this with her weight loss patient is doing great her hemoglobin A1c's are looking better we will repeat this again in 4 to 5 months  - HEMOGLOBIN A1C; Future    2. Hyperlipidemia, unspecified hyperlipidemia type  Patient's triglyceride was slightly elevated her HDL is low patient will try to increase her exercise we will repeat her lipid panel again sometime in August this is a chronic problem.  Patient to continue taking her atorvastatin  - Comp Metabolic Panel; Future  - Lipid Profile; Future    3. Thyroid nodule  Patient states that she will contact endocrine concerning her last ultrasound of her thyroid her TSH is within normal limits       Return in about 5 months (around 9/26/2023).    Please note that this dictation was created using voice " recognition software. I have made every reasonable attempt to correct obvious errors, but I expect that there are errors of grammar and possibly content that I did not discover before finalizing the note.

## 2023-05-05 DIAGNOSIS — E04.1 THYROID NODULE: ICD-10-CM

## 2023-05-05 DIAGNOSIS — E55.9 VITAMIN D DEFICIENCY: ICD-10-CM

## 2023-07-14 ENCOUNTER — HOSPITAL ENCOUNTER (OUTPATIENT)
Dept: LAB | Facility: MEDICAL CENTER | Age: 63
End: 2023-07-14
Attending: FAMILY MEDICINE
Payer: COMMERCIAL

## 2023-07-14 ENCOUNTER — HOSPITAL ENCOUNTER (OUTPATIENT)
Dept: LAB | Facility: MEDICAL CENTER | Age: 63
End: 2023-07-14
Payer: COMMERCIAL

## 2023-07-14 DIAGNOSIS — E78.5 HYPERLIPIDEMIA, UNSPECIFIED HYPERLIPIDEMIA TYPE: ICD-10-CM

## 2023-07-14 DIAGNOSIS — E55.9 VITAMIN D DEFICIENCY: ICD-10-CM

## 2023-07-14 DIAGNOSIS — E04.1 THYROID NODULE: ICD-10-CM

## 2023-07-14 DIAGNOSIS — R73.03 PREDIABETES: ICD-10-CM

## 2023-07-14 LAB
25(OH)D3 SERPL-MCNC: 61 NG/ML (ref 30–100)
ALBUMIN SERPL BCP-MCNC: 4.7 G/DL (ref 3.2–4.9)
ALBUMIN/GLOB SERPL: 1.7 G/DL
ALP SERPL-CCNC: 95 U/L (ref 30–99)
ALT SERPL-CCNC: 27 U/L (ref 2–50)
ANION GAP SERPL CALC-SCNC: 12 MMOL/L (ref 7–16)
AST SERPL-CCNC: 10 U/L (ref 12–45)
BILIRUB SERPL-MCNC: 0.3 MG/DL (ref 0.1–1.5)
BUN SERPL-MCNC: 18 MG/DL (ref 8–22)
CALCIUM ALBUM COR SERPL-MCNC: 9.6 MG/DL (ref 8.5–10.5)
CALCIUM SERPL-MCNC: 10.2 MG/DL (ref 8.5–10.5)
CHLORIDE SERPL-SCNC: 105 MMOL/L (ref 96–112)
CHOLEST SERPL-MCNC: 114 MG/DL (ref 100–199)
CO2 SERPL-SCNC: 24 MMOL/L (ref 20–33)
CREAT SERPL-MCNC: 0.85 MG/DL (ref 0.5–1.4)
EST. AVERAGE GLUCOSE BLD GHB EST-MCNC: 126 MG/DL
FASTING STATUS PATIENT QL REPORTED: NORMAL
GFR SERPLBLD CREATININE-BSD FMLA CKD-EPI: 77 ML/MIN/1.73 M 2
GLOBULIN SER CALC-MCNC: 2.8 G/DL (ref 1.9–3.5)
GLUCOSE SERPL-MCNC: 114 MG/DL (ref 65–99)
HBA1C MFR BLD: 6 % (ref 4–5.6)
HDLC SERPL-MCNC: 35 MG/DL
LDLC SERPL CALC-MCNC: 55 MG/DL
POTASSIUM SERPL-SCNC: 4.5 MMOL/L (ref 3.6–5.5)
PROT SERPL-MCNC: 7.5 G/DL (ref 6–8.2)
SODIUM SERPL-SCNC: 141 MMOL/L (ref 135–145)
T4 FREE SERPL-MCNC: 1.4 NG/DL (ref 0.93–1.7)
TRIGL SERPL-MCNC: 118 MG/DL (ref 0–149)
TSH SERPL DL<=0.005 MIU/L-ACNC: 1.16 UIU/ML (ref 0.38–5.33)

## 2023-07-14 PROCEDURE — 36415 COLL VENOUS BLD VENIPUNCTURE: CPT

## 2023-07-14 PROCEDURE — 84439 ASSAY OF FREE THYROXINE: CPT

## 2023-07-14 PROCEDURE — 82306 VITAMIN D 25 HYDROXY: CPT

## 2023-07-14 PROCEDURE — 80061 LIPID PANEL: CPT

## 2023-07-14 PROCEDURE — 83036 HEMOGLOBIN GLYCOSYLATED A1C: CPT

## 2023-07-14 PROCEDURE — 84443 ASSAY THYROID STIM HORMONE: CPT

## 2023-07-14 PROCEDURE — 80053 COMPREHEN METABOLIC PANEL: CPT

## 2023-08-03 ENCOUNTER — OFFICE VISIT (OUTPATIENT)
Dept: ENDOCRINOLOGY | Facility: MEDICAL CENTER | Age: 63
End: 2023-08-03
Payer: COMMERCIAL

## 2023-08-03 VITALS
BODY MASS INDEX: 28.22 KG/M2 | WEIGHT: 186.2 LBS | HEART RATE: 96 BPM | HEIGHT: 68 IN | OXYGEN SATURATION: 94 % | SYSTOLIC BLOOD PRESSURE: 120 MMHG | DIASTOLIC BLOOD PRESSURE: 78 MMHG

## 2023-08-03 DIAGNOSIS — R53.83 OTHER FATIGUE: ICD-10-CM

## 2023-08-03 DIAGNOSIS — E04.1 THYROID NODULE: ICD-10-CM

## 2023-08-03 DIAGNOSIS — E55.9 VITAMIN D DEFICIENCY: ICD-10-CM

## 2023-08-03 DIAGNOSIS — E06.3 HASHIMOTO'S DISEASE: ICD-10-CM

## 2023-08-03 DIAGNOSIS — R73.03 PREDIABETES: ICD-10-CM

## 2023-08-03 PROCEDURE — 3074F SYST BP LT 130 MM HG: CPT

## 2023-08-03 PROCEDURE — 99214 OFFICE O/P EST MOD 30 MIN: CPT

## 2023-08-03 PROCEDURE — 3078F DIAST BP <80 MM HG: CPT

## 2023-08-03 PROCEDURE — 99211 OFF/OP EST MAY X REQ PHY/QHP: CPT

## 2023-08-03 ASSESSMENT — FIBROSIS 4 INDEX: FIB4 SCORE: 0.53

## 2023-08-03 NOTE — PROGRESS NOTES
Chief Complaint: Follow-up of Thyroid Nodule    HPI:   Jessica Bishop is a 63 y.o. female    1. Thyroid Nodule:   History of Thyroid Nodule diagnosed December 2021and is here for follow-up  Patient failed to follow-up due to life constrains, and was not able to come for a follow-up appointment until now    Thyroid US from December 3, 2021 was reviewed and discussed with the patient in detail.    This showed a 1.03 cm thyroid nodule on the right lateral thyroid lobe, solid, isoechoic, taller than wide, TR 5  Thyroid gland was heterogeneous with normal vascularity    FNA biopsy was ordered and done on 3/30/2022 unsuccessfully-3/30/2022 patient was told that thyroid nodule was too small and could not be found  On last appointment patient was agreeable to repeat thyroid ultrasound    Thyroid ultrasound done on 7/5/2022 showed:  Nodule #1 located on the right lower thyroid lobe measuring 1.11 cm previously measuring 1.03 cm solid isoechoic, taller than wide, with peripheral echogenicity TR 5    She denies prior thyroid biopsy and denies prior thyroid surgery.    She denies lumps or enlargement in the neck.   Denies hoarseness, dysphagia, dyspnea, anterior neck pain and anterior neck swelling.       She reports a family history of goiter.    She denies a history of radiation exposure to head or neck.    She reports fatigue related to sleeping later at night, weight gain related to food, sweating     She denies history of taking thyroid medications.        Latest Reference Range & Units 07/14/23 09:54   TSH 0.380 - 5.330 uIU/mL 1.160   Free T-4 0.93 - 1.70 ng/dL 1.40     3.  Hashimoto's disease:  Elevated antibody levels   Ref. Range 4/2/2022 11:10   Microsomal -Tpo- Abs Latest Ref Range: 0.0 - 9.0 IU/mL 105.0 (H)   Anti-Thyroglobulin Latest Ref Range: 0.0 - 4.0 IU/mL <0.9       3.  Vitamin D deficiency:  Currently taking 5000IUs daily    Latest Reference Range & Units 07/14/23 09:54   25-Hydroxy   Vitamin D 25 30 - 100  ng/mL 61     4.  Prediabetes:  Follow-up PCP   Latest Reference Range & Units 07/14/23 07:36   Glycohemoglobin 4.0 - 5.6 % 6.0 (H)   Estim. Avg Glu mg/dL 126   Fasting Status  Fasting     5. Other fatigue:   Fatigue, no energy, constipation, brain fogginess, muscle aches, joint pain, leg cramp, weight gain     Patient's medications, allergies, and social histories were reviewed and updated as appropriate.      ROS:      CONS:     No fever, no chills, no weight loss, no fatigue   EYES:      No diplopia, no blurry vision, no redness of eyes, no swelling of eyelids   ENT:    No hearing loss, No ear pain, No sore throat, no dysphagia, no neck swelling   CV:     No chest pain, no palpitations, no claudication, no orthopnea, no PND   PULM:    No SOB, no cough, no hemoptysis, no wheezing    GI:   No nausea, no vomiting, no diarrhea, no constipation, no bloody stools   :  Passing urine well, no dysuria, no hematuria   ENDO:   No polyuria, no polydipsia, no heat intolerance, no cold intolerance   NEURO: No headaches, no dizziness, no convulsions, no tremors   MUSC:  No joint swellings, no arthralgias, no myalgias, no weakness   SKIN:   No rash, no ulcers, no dry skin   PSYCH:   No depression, no anxiety, no difficulty sleeping       Past Medical History:  Patient Active Problem List    Diagnosis Date Noted    Thyroid nodule 04/26/2023    Low vitamin D level 12/27/2021    Hypercalcemia 12/04/2020    Diabetes mellitus (HCC) 11/05/2020    Erythrocytosis 11/05/2020    Hyperlipidemia 04/11/2019       Past Surgical History:  Past Surgical History:   Procedure Laterality Date    HYSTERECTOMY, TOTAL ABDOMINAL      ovaries remain; for endometriosis     TONSILLECTOMY          Allergies:  Patient has no known allergies.     Current Medications:    Current Outpatient Medications:     atorvastatin (LIPITOR) 20 MG Tab, Take 1 Tablet by mouth every day., Disp: 90 Tablet, Rfl: 1    Lancets, Per insurance coverage. Check blood sugar up to  2-3x per day and prn ssx of high or low sugar, Disp: 100 Each, Rfl: 1    Alcohol Swabs, Per insurance coverage. Wipe site with prep pad prior to injection, Disp: 100 Each, Rfl: 3    ACCU-CHEK GUIDE strip, CHECK BLOOD SUGAR UP TO 2-3X PER DAY AND AS NEEDED FOR HIGH OR LOW SUGAR, Disp: 300 Strip, Rfl: 3    Blood Glucose Meter Kit, Per insurance coverage. Check blood sugar up to 2-3x per day and prn ssx of high or low sugar, Disp: 1 Kit, Rfl: 0    Social History:  Social History     Socioeconomic History    Marital status: Single     Spouse name: Not on file    Number of children: Not on file    Years of education: Not on file    Highest education level: Bachelor's degree (e.g., BA, AB, BS)   Occupational History    Not on file   Tobacco Use    Smoking status: Former     Packs/day: 1.00     Years: 20.00     Pack years: 20.00     Types: Cigarettes     Quit date: 2008     Years since quitting: 15.5    Smokeless tobacco: Never   Vaping Use    Vaping Use: Former   Substance and Sexual Activity    Alcohol use: Yes     Comment: Occ    Drug use: Yes     Types: Marijuana     Comment: Gummy    Sexual activity: Not on file   Other Topics Concern    Not on file   Social History Narrative    /      Social Determinants of Health     Financial Resource Strain: Medium Risk (4/26/2023)    Overall Financial Resource Strain (CARDIA)     Difficulty of Paying Living Expenses: Somewhat hard   Food Insecurity: No Food Insecurity (4/26/2023)    Hunger Vital Sign     Worried About Running Out of Food in the Last Year: Never true     Ran Out of Food in the Last Year: Never true   Transportation Needs: No Transportation Needs (4/26/2023)    PRAPARE - Transportation     Lack of Transportation (Medical): No     Lack of Transportation (Non-Medical): No   Physical Activity: Insufficiently Active (4/26/2023)    Exercise Vital Sign     Days of Exercise per Week: 3 days     Minutes of Exercise per Session: 30 min  "  Stress: Stress Concern Present (4/26/2023)    [employer] - Occupational Stress Questionnaire     Feeling of Stress : To some extent   Social Connections: Socially Isolated (4/26/2023)    Social Connection and Isolation Panel [NHANES]     Frequency of Communication with Friends and Family: Once a week     Frequency of Social Gatherings with Friends and Family: Never     Attends Religion Services: Never     Active Member of Clubs or Organizations: No     Attends Club or Organization Meetings: Never     Marital Status: Living with partner   Intimate Partner Violence: Not on file   Housing Stability: Low Risk  (4/26/2023)    Housing Stability Vital Sign     Unable to Pay for Housing in the Last Year: No     Number of Places Lived in the Last Year: 1     Unstable Housing in the Last Year: No        Family History:   Family History   Problem Relation Age of Onset    Stroke Mother     Heart Disease Mother     Lung Disease Father         COPD, smoker     Heart Disease Maternal Grandmother     Cancer Paternal Grandmother     Cancer Paternal Grandfather          PHYSICAL EXAM:   Vital signs: /78 (BP Location: Left arm, Patient Position: Sitting, BP Cuff Size: Adult)   Pulse 96   Ht 1.727 m (5' 8\")   Wt 84.5 kg (186 lb 3.2 oz)   SpO2 94%   BMI 28.31 kg/m²   GENERAL: Well-developed, well-nourished  in no apparent distress.    SKIN: No rashes, lesions. Turgor is normal.    Labs:  Lab Results   Component Value Date/Time    WBC 7.1 03/13/2023 09:11 AM    RBC 5.38 03/13/2023 09:11 AM    HEMOGLOBIN 16.4 (H) 03/13/2023 09:11 AM    MCV 91.3 03/13/2023 09:11 AM    MCH 30.5 03/13/2023 09:11 AM    MCHC 33.4 (L) 03/13/2023 09:11 AM    RDW 44.2 03/13/2023 09:11 AM    MPV 10.7 03/13/2023 09:11 AM       Lab Results   Component Value Date/Time    SODIUM 141 07/14/2023 07:36 AM    POTASSIUM 4.5 07/14/2023 07:36 AM    CHLORIDE 105 07/14/2023 07:36 AM    CO2 24 07/14/2023 07:36 AM    ANION 12.0 " 07/14/2023 07:36 AM    GLUCOSE 114 (H) 07/14/2023 07:36 AM    BUN 18 07/14/2023 07:36 AM    CREATININE 0.85 07/14/2023 07:36 AM    CALCIUM 10.2 07/14/2023 07:36 AM    ASTSGOT 10 (L) 07/14/2023 07:36 AM    ALTSGPT 27 07/14/2023 07:36 AM    TBILIRUBIN 0.3 07/14/2023 07:36 AM    ALBUMIN 4.7 07/14/2023 07:36 AM    TOTPROTEIN 7.5 07/14/2023 07:36 AM    GLOBULIN 2.8 07/14/2023 07:36 AM    AGRATIO 1.7 07/14/2023 07:36 AM       Lab Results   Component Value Date/Time    TSHULTRASEN 1.960 12/15/2021 0638     Lab Results   Component Value Date/Time    FREET4 0.82 10/28/2014 0630     Imaging:    Thyroid ultrasound done on 7/5/2022 showed:  Nodule #1 located on the right lower thyroid lobe measuring 1.11 cm previously measuring 1.03 cm solid isoechoic, taller than wide, with peripheral echogenicity TR 5    ASSESSMENT/PLAN:   1. Thyroid nodule  Unstable  Based on characteristics of thyroid nodule biopsy is prudent  FNA biopsy with reflex Afirma ordered  Last biopsy of this nodule was unsuccessful-please see HPI for further information  - US-FNA BIOPSY W/ US GUIDANCE; Future    2. Hashimoto's disease  Elevated antibody levels    3. Vitamin D deficiency  Stable  Continue regimen-see HPI    4. Prediabetes  Stable  Follow-up with PCP    5. Other fatigue  Unstable  Within range thyroid hormones  We will further evaluate her fatigue symptoms with the following hormones  - ACTH; Future  - CORTISOL; Future  - 17-OH PROGESTERONE; Future  - RENIN ACTIVITY AND ALDOSTERONE  - PROLACTIN; Future  - IGF-1 SOMATOMEDIN; Future  - PTH WITH CALCIUM; Future  - IONIZED CALCIUM; Future  - TSH; Future  - FREE THYROXINE; Future     Disposition: Follow-up in 4 to 8 weeks  Do blood work 2 weeks prior to next appointment  Schedule FNA biopsy as soon as possible    Thank you kindly for allowing me to participate in the thyroid care plan for this patient.      NIYAH Bradshaw.P.R.N.  08/03/23      CC:   Claudia Lynne M.D.

## 2023-08-11 DIAGNOSIS — E04.1 THYROID NODULE: ICD-10-CM

## 2023-08-22 ENCOUNTER — HOSPITAL ENCOUNTER (OUTPATIENT)
Dept: LAB | Facility: MEDICAL CENTER | Age: 63
End: 2023-08-22
Payer: COMMERCIAL

## 2023-08-22 DIAGNOSIS — R53.83 OTHER FATIGUE: ICD-10-CM

## 2023-08-22 LAB
CA-I SERPL-SCNC: 1.2 MMOL/L (ref 1.1–1.3)
CALCIUM SERPL-MCNC: 9.9 MG/DL (ref 8.5–10.5)
CORTIS SERPL-MCNC: 7.4 UG/DL (ref 0–23)
PROLACTIN SERPL-MCNC: 6.82 NG/ML (ref 2.8–26)
PTH-INTACT SERPL-MCNC: 44.2 PG/ML (ref 14–72)
T4 FREE SERPL-MCNC: 1.43 NG/DL (ref 0.93–1.7)
TSH SERPL DL<=0.005 MIU/L-ACNC: 0.59 UIU/ML (ref 0.38–5.33)

## 2023-08-22 PROCEDURE — 84439 ASSAY OF FREE THYROXINE: CPT

## 2023-08-22 PROCEDURE — 84443 ASSAY THYROID STIM HORMONE: CPT

## 2023-08-22 PROCEDURE — 82024 ASSAY OF ACTH: CPT

## 2023-08-22 PROCEDURE — 84146 ASSAY OF PROLACTIN: CPT

## 2023-08-22 PROCEDURE — 36415 COLL VENOUS BLD VENIPUNCTURE: CPT

## 2023-08-22 PROCEDURE — 82088 ASSAY OF ALDOSTERONE: CPT

## 2023-08-22 PROCEDURE — 83970 ASSAY OF PARATHORMONE: CPT

## 2023-08-22 PROCEDURE — 82330 ASSAY OF CALCIUM: CPT

## 2023-08-22 PROCEDURE — 83498 ASY HYDROXYPROGESTERONE 17-D: CPT

## 2023-08-22 PROCEDURE — 84244 ASSAY OF RENIN: CPT

## 2023-08-22 PROCEDURE — 84305 ASSAY OF SOMATOMEDIN: CPT

## 2023-08-22 PROCEDURE — 82533 TOTAL CORTISOL: CPT

## 2023-08-24 LAB
ACTH PLAS-MCNC: 7.3 PG/ML (ref 7.2–63.3)
ALDOST SERPL-MCNC: 9.9 NG/DL
IGF-I SERPL-MCNC: 223 NG/ML (ref 38–244)
IGF-I Z-SCORE SERPL: 1.4

## 2023-08-26 LAB — 17OHP SERPL-MCNC: 30.55 NG/DL

## 2023-08-27 LAB — RENIN PLAS-CCNC: 0.7 NG/ML/HR

## 2023-09-05 ENCOUNTER — HOSPITAL ENCOUNTER (OUTPATIENT)
Dept: RADIOLOGY | Facility: MEDICAL CENTER | Age: 63
End: 2023-09-05
Payer: COMMERCIAL

## 2023-09-05 DIAGNOSIS — E04.1 THYROID NODULE: ICD-10-CM

## 2023-09-05 PROCEDURE — 76536 US EXAM OF HEAD AND NECK: CPT

## 2023-09-08 ENCOUNTER — OFFICE VISIT (OUTPATIENT)
Dept: ENDOCRINOLOGY | Facility: MEDICAL CENTER | Age: 63
End: 2023-09-08
Payer: COMMERCIAL

## 2023-09-08 VITALS
DIASTOLIC BLOOD PRESSURE: 88 MMHG | HEART RATE: 91 BPM | BODY MASS INDEX: 28.69 KG/M2 | WEIGHT: 189.3 LBS | OXYGEN SATURATION: 94 % | HEIGHT: 68 IN | SYSTOLIC BLOOD PRESSURE: 142 MMHG

## 2023-09-08 DIAGNOSIS — E66.3 OVERWEIGHT (BMI 25.0-29.9): ICD-10-CM

## 2023-09-08 DIAGNOSIS — R53.83 OTHER FATIGUE: ICD-10-CM

## 2023-09-08 DIAGNOSIS — E04.1 THYROID NODULE: ICD-10-CM

## 2023-09-08 DIAGNOSIS — E55.9 VITAMIN D DEFICIENCY: ICD-10-CM

## 2023-09-08 DIAGNOSIS — E06.3 HASHIMOTO'S DISEASE: ICD-10-CM

## 2023-09-08 DIAGNOSIS — R73.03 PREDIABETES: ICD-10-CM

## 2023-09-08 PROCEDURE — 3077F SYST BP >= 140 MM HG: CPT

## 2023-09-08 PROCEDURE — 99215 OFFICE O/P EST HI 40 MIN: CPT

## 2023-09-08 PROCEDURE — 3079F DIAST BP 80-89 MM HG: CPT

## 2023-09-08 PROCEDURE — 99211 OFF/OP EST MAY X REQ PHY/QHP: CPT

## 2023-09-08 ASSESSMENT — FIBROSIS 4 INDEX: FIB4 SCORE: 0.53

## 2023-09-08 NOTE — PROGRESS NOTES
Chief Complaint: Follow-up of Thyroid Nodule    HPI:   Jessica Bishop is a 63 y.o. female    1. Thyroid Nodule:   History of Thyroid Nodule diagnosed December 2021and is here for follow-up  Patient failed to follow-up due to life constrains, and was not able to come for a follow-up appointment until now    Thyroid US from December 3, 2021 was reviewed and discussed with the patient in detail.    This showed a 1.03 cm thyroid nodule on the right lateral thyroid lobe, solid, isoechoic, taller than wide, TR 5  Thyroid gland was heterogeneous with normal vascularity    FNA biopsy was ordered and done on 3/30/2022 unsuccessfully-3/30/2022 patient was told that thyroid nodule was too small and could not be found  On last appointment patient was agreeable to repeat thyroid ultrasound    Thyroid ultrasound done on 7/5/2022 showed:  Nodule #1 located on the right lower thyroid lobe measuring 1.11 cm previously measuring 1.03 cm solid isoechoic, taller than wide, with peripheral echogenicity TR 5    FNA biopsy was ordered on last appointment but the radiology department needed a more updated ultrasound  Ultrasound was done on 9/6/2023 showing no changes to her thyroid nodules  FNA biopsy schedule                She denies prior thyroid biopsy and denies prior thyroid surgery.    She denies lumps or enlargement in the neck.   Denies hoarseness, dysphagia, dyspnea, anterior neck pain and anterior neck swelling.       She reports a family history of goiter.    She denies a history of radiation exposure to head or neck.    She reports fatigue related to sleeping later at night, weight gain related to food, sweating     She denies history of taking thyroid medications.        Latest Reference Range & Units 08/22/23 10:43   TSH 0.380 - 5.330 uIU/mL 0.590   Free T-4 0.93 - 1.70 ng/dL 1.43     3.  Hashimoto's disease:  Elevated antibody levels   Ref. Range 4/2/2022 11:10   Microsomal -Tpo- Abs Latest Ref Range: 0.0 - 9.0 IU/mL  "105.0 (H)   Anti-Thyroglobulin Latest Ref Range: 0.0 - 4.0 IU/mL <0.9       3.  Vitamin D deficiency:  Currently taking 5000IUs daily    Latest Reference Range & Units 07/14/23 09:54   25-Hydroxy   Vitamin D 25 30 - 100 ng/mL 61     4.  Prediabetes:  Follow-up PCP   Latest Reference Range & Units 07/14/23 07:36   Glycohemoglobin 4.0 - 5.6 % 6.0 (H)   Estim. Avg Glu mg/dL 126   Fasting Status  Fasting     5. Other fatigue:   Reports that she is eating a bit more carbs and her energy is improved as well as her other symptoms   Latest Reference Range & Units 08/22/23 10:43   Ionized Calcium 1.1 - 1.3 mmol/L 1.2        Latest Reference Range & Units 08/22/23 10:43   Cortisol 0.0 - 23.0 ug/dL 7.4      Latest Reference Range & Units 08/22/23 10:43   17 Alpha Hydroxyprogest <=206.00 ng/dL 30.55      Latest Reference Range & Units 08/22/23 10:43   Acth 7.2 - 63.3 pg/mL 7.3   Aldos Serum ng/dL 9.9   IGF1 38 - 244 ng/mL 223   IGF-1 Z Score Calculation  1.4   Prolactin 2.80 - 26.00 ng/mL 6.82   Pth, Intact 14.0 - 72.0 pg/mL 44.2   Renin Activity ng/mL/hr 0.7     6. Overweight:   Exercise: Gym 2-3x/week and plays picke ball   Diet: healthy in general   8/3/2023   1330 9/8/2023   0911 Most Recent Value     Weight: 84.5 kg (186 lb 3.2 oz) 85.9 kg (189 lb 4.8 oz) 85.9 kg (189 lb 4.8 oz)  as of 9/8/2023    Body Mass Index:   28.78 kg/m² Abnormal    1.727 m (5' 8\")  as of 9/8/2023   85.9 kg (189 lb 4.8 oz)  as of 9/8/2023        Patient's medications, allergies, and social histories were reviewed and updated as appropriate.      ROS:      CONS:     No fever, no chills, no weight loss, no fatigue   EYES:      No diplopia, no blurry vision, no redness of eyes, no swelling of eyelids   ENT:    No hearing loss, No ear pain, No sore throat, no dysphagia, no neck swelling   CV:     No chest pain, no palpitations, no claudication, no orthopnea, no PND   PULM:    No SOB, no cough, no hemoptysis, no wheezing    GI:   No nausea, no vomiting, " no diarrhea, no constipation, no bloody stools   :  Passing urine well, no dysuria, no hematuria   ENDO:   No polyuria, no polydipsia, no heat intolerance, no cold intolerance   NEURO: No headaches, no dizziness, no convulsions, no tremors   MUSC:  No joint swellings, no arthralgias, no myalgias, no weakness   SKIN:   No rash, no ulcers, no dry skin   PSYCH:   No depression, no anxiety, no difficulty sleeping       Past Medical History:  Patient Active Problem List    Diagnosis Date Noted    Thyroid nodule 04/26/2023    Low vitamin D level 12/27/2021    Hypercalcemia 12/04/2020    Diabetes mellitus (HCC) 11/05/2020    Erythrocytosis 11/05/2020    Hyperlipidemia 04/11/2019       Past Surgical History:  Past Surgical History:   Procedure Laterality Date    HYSTERECTOMY, TOTAL ABDOMINAL      ovaries remain; for endometriosis     TONSILLECTOMY          Allergies:  Patient has no known allergies.     Current Medications:    Current Outpatient Medications:     atorvastatin (LIPITOR) 20 MG Tab, Take 1 Tablet by mouth every day., Disp: 90 Tablet, Rfl: 1    Lancets, Per insurance coverage. Check blood sugar up to 2-3x per day and prn ssx of high or low sugar, Disp: 100 Each, Rfl: 1    Alcohol Swabs, Per insurance coverage. Wipe site with prep pad prior to injection, Disp: 100 Each, Rfl: 3    ACCU-CHEK GUIDE strip, CHECK BLOOD SUGAR UP TO 2-3X PER DAY AND AS NEEDED FOR HIGH OR LOW SUGAR, Disp: 300 Strip, Rfl: 3    Blood Glucose Meter Kit, Per insurance coverage. Check blood sugar up to 2-3x per day and prn ssx of high or low sugar, Disp: 1 Kit, Rfl: 0    Social History:  Social History     Socioeconomic History    Marital status: Single     Spouse name: Not on file    Number of children: Not on file    Years of education: Not on file    Highest education level: Bachelor's degree (e.g., BA, AB, BS)   Occupational History    Not on file   Tobacco Use    Smoking status: Former     Current packs/day: 0.00     Average packs/day:  1 pack/day for 20.0 years (20.0 ttl pk-yrs)     Types: Cigarettes     Start date: 1988     Quit date: 2008     Years since quitting: 15.6    Smokeless tobacco: Never   Vaping Use    Vaping Use: Former   Substance and Sexual Activity    Alcohol use: Yes     Comment: Occ    Drug use: Yes     Types: Marijuana     Comment: Gummy    Sexual activity: Not on file   Other Topics Concern    Not on file   Social History Narrative    /      Social Determinants of Health     Financial Resource Strain: Medium Risk (4/26/2023)    Overall Financial Resource Strain (CARDIA)     Difficulty of Paying Living Expenses: Somewhat hard   Food Insecurity: No Food Insecurity (4/26/2023)    Hunger Vital Sign     Worried About Running Out of Food in the Last Year: Never true     Ran Out of Food in the Last Year: Never true   Transportation Needs: No Transportation Needs (4/26/2023)    PRAPARE - Transportation     Lack of Transportation (Medical): No     Lack of Transportation (Non-Medical): No   Physical Activity: Insufficiently Active (4/26/2023)    Exercise Vital Sign     Days of Exercise per Week: 3 days     Minutes of Exercise per Session: 30 min   Stress: Stress Concern Present (4/26/2023)    [employer] [place] [employer] - Occupational Stress Questionnaire     Feeling of Stress : To some extent   Social Connections: Socially Isolated (4/26/2023)    Social Connection and Isolation Panel [NHANES]     Frequency of Communication with Friends and Family: Once a week     Frequency of Social Gatherings with Friends and Family: Never     Attends Sabianism Services: Never     Active Member of Clubs or Organizations: No     Attends Club or Organization Meetings: Never     Marital Status: Living with partner   Intimate Partner Violence: Not on file   Housing Stability: Low Risk  (4/26/2023)    Housing Stability Vital Sign     Unable to Pay for Housing in the Last Year: No     Number of Places Lived in  "the Last Year: 1     Unstable Housing in the Last Year: No        Family History:   Family History   Problem Relation Age of Onset    Stroke Mother     Heart Disease Mother     Lung Disease Father         COPD, smoker     Heart Disease Maternal Grandmother     Cancer Paternal Grandmother     Cancer Paternal Grandfather          PHYSICAL EXAM:   Vital signs: BP (!) 142/88 (BP Location: Left arm, Patient Position: Sitting, BP Cuff Size: Adult)   Pulse 91   Ht 1.727 m (5' 8\")   Wt 85.9 kg (189 lb 4.8 oz)   SpO2 94%   BMI 28.78 kg/m²   GENERAL: Well-developed, well-nourished  in no apparent distress.    SKIN: No rashes, lesions. Turgor is normal.    Labs:  Lab Results   Component Value Date/Time    WBC 7.1 03/13/2023 09:11 AM    RBC 5.38 03/13/2023 09:11 AM    HEMOGLOBIN 16.4 (H) 03/13/2023 09:11 AM    MCV 91.3 03/13/2023 09:11 AM    MCH 30.5 03/13/2023 09:11 AM    MCHC 33.4 (L) 03/13/2023 09:11 AM    RDW 44.2 03/13/2023 09:11 AM    MPV 10.7 03/13/2023 09:11 AM       Lab Results   Component Value Date/Time    SODIUM 141 07/14/2023 07:36 AM    POTASSIUM 4.5 07/14/2023 07:36 AM    CHLORIDE 105 07/14/2023 07:36 AM    CO2 24 07/14/2023 07:36 AM    ANION 12.0 07/14/2023 07:36 AM    GLUCOSE 114 (H) 07/14/2023 07:36 AM    BUN 18 07/14/2023 07:36 AM    CREATININE 0.85 07/14/2023 07:36 AM    CALCIUM 9.9 08/22/2023 10:43 AM    ASTSGOT 10 (L) 07/14/2023 07:36 AM    ALTSGPT 27 07/14/2023 07:36 AM    TBILIRUBIN 0.3 07/14/2023 07:36 AM    ALBUMIN 4.7 07/14/2023 07:36 AM    TOTPROTEIN 7.5 07/14/2023 07:36 AM    GLOBULIN 2.8 07/14/2023 07:36 AM    AGRATIO 1.7 07/14/2023 07:36 AM       Lab Results   Component Value Date/Time    TSHULTRASEN 1.960 12/15/2021 0638     Lab Results   Component Value Date/Time    FREET4 0.82 10/28/2014 0630     Imaging:    Thyroid ultrasound done on 7/5/2022 showed:  Nodule #1 located on the right lower thyroid lobe measuring 1.11 cm previously measuring 1.03 cm solid isoechoic, taller than wide, with " peripheral echogenicity TR 5    ASSESSMENT/PLAN:   1. Thyroid nodule  Unstable  FNA biopsy scheduled for this week  Discussed with patient the possible results options including benign, cancer, poor sampling, nondiagnostic-for which Afirma will be ordered    2. Hashimoto's disease  Elevated antibody levels    3. Vitamin D deficiency  - Stable  -Continue regimen-see HPI      4. Prediabetes  Unstable  Follow with PCP    5. Other fatigue  Improving  Cortisol levels below 10-however this were done close to 1100-I will order cortisol levels again at next appointment    6. Overweight (BMI 25.0-29.9)  Unstable  - Exercise for least 150 minutes/week  - Stable hydration  - Maintain a healthy diet, minimal carbohydrate, portion control       Disposition: Follow-up in 4 weeks   No blood work before next appointment  Schedule FNA biopsy as soon as possible    Thank you kindly for allowing me to participate in the thyroid care plan for this patient.  I spent 46 minutes on this visit between precharting, discussing diagnosis with patient, medications, treatment plan      Ruddy Beckwith A.P.R.N.  09/08/23        CC:   Claudia Lynne M.D.

## 2023-09-15 ENCOUNTER — HOSPITAL ENCOUNTER (OUTPATIENT)
Dept: RADIOLOGY | Facility: MEDICAL CENTER | Age: 63
End: 2023-09-15
Payer: COMMERCIAL

## 2023-09-15 DIAGNOSIS — E04.1 THYROID NODULE: ICD-10-CM

## 2023-09-15 LAB — CYTOLOGY REG CYTOL: NORMAL

## 2023-09-15 PROCEDURE — 10005 FNA BX W/US GDN 1ST LES: CPT

## 2023-09-15 PROCEDURE — 88173 CYTOPATH EVAL FNA REPORT: CPT

## 2023-09-15 NOTE — PROGRESS NOTES
OPIR     Right Thyroid Fine Needle Aspiration done by Dr. Aguirre, Right anterior aspect of neck access site; x1 jar of cytolyt and x1 vial Affirma obtained and sent to pathology.  Patient tolerated the procedure well.    All questions and concerns answered prior to being dc'd; pt provided with appropriate education for procedure, pt discharge to home.

## 2023-09-21 ENCOUNTER — APPOINTMENT (OUTPATIENT)
Dept: MEDICAL GROUP | Facility: PHYSICIAN GROUP | Age: 63
End: 2023-09-21
Payer: COMMERCIAL

## 2023-10-23 PROBLEM — E11.9 DIABETES MELLITUS (HCC): Chronic | Status: RESOLVED | Noted: 2020-11-05 | Resolved: 2023-10-23

## 2023-10-25 ENCOUNTER — OFFICE VISIT (OUTPATIENT)
Dept: ENDOCRINOLOGY | Facility: MEDICAL CENTER | Age: 63
End: 2023-10-25
Payer: COMMERCIAL

## 2023-10-25 VITALS
SYSTOLIC BLOOD PRESSURE: 136 MMHG | DIASTOLIC BLOOD PRESSURE: 90 MMHG | BODY MASS INDEX: 28.49 KG/M2 | WEIGHT: 188 LBS | OXYGEN SATURATION: 96 % | HEIGHT: 68 IN | HEART RATE: 94 BPM

## 2023-10-25 DIAGNOSIS — E55.9 VITAMIN D DEFICIENCY: ICD-10-CM

## 2023-10-25 DIAGNOSIS — E04.1 THYROID NODULE: ICD-10-CM

## 2023-10-25 DIAGNOSIS — E06.3 HASHIMOTO'S DISEASE: ICD-10-CM

## 2023-10-25 DIAGNOSIS — E66.3 OVERWEIGHT (BMI 25.0-29.9): ICD-10-CM

## 2023-10-25 DIAGNOSIS — R73.03 PREDIABETES: ICD-10-CM

## 2023-10-25 DIAGNOSIS — R53.83 OTHER FATIGUE: ICD-10-CM

## 2023-10-25 PROCEDURE — 3075F SYST BP GE 130 - 139MM HG: CPT

## 2023-10-25 PROCEDURE — 99211 OFF/OP EST MAY X REQ PHY/QHP: CPT

## 2023-10-25 PROCEDURE — 3080F DIAST BP >= 90 MM HG: CPT

## 2023-10-25 PROCEDURE — 99214 OFFICE O/P EST MOD 30 MIN: CPT

## 2023-10-25 RX ORDER — 0.9 % SODIUM CHLORIDE 0.9 %
10 VIAL (ML) INJECTION PRN
Status: CANCELLED | OUTPATIENT
Start: 2023-10-25

## 2023-10-25 RX ORDER — 0.9 % SODIUM CHLORIDE 0.9 %
VIAL (ML) INJECTION PRN
Status: CANCELLED | OUTPATIENT
Start: 2023-10-25

## 2023-10-25 RX ORDER — COSYNTROPIN 0.25 MG/ML
250 INJECTION, POWDER, FOR SOLUTION INTRAMUSCULAR; INTRAVENOUS ONCE
Status: CANCELLED | OUTPATIENT
Start: 2023-10-25 | End: 2023-10-25

## 2023-10-25 RX ORDER — 0.9 % SODIUM CHLORIDE 0.9 %
3 VIAL (ML) INJECTION PRN
Status: CANCELLED | OUTPATIENT
Start: 2023-10-25

## 2023-10-25 ASSESSMENT — FIBROSIS 4 INDEX: FIB4 SCORE: 0.53

## 2023-10-25 NOTE — PROGRESS NOTES
Chief Complaint: Follow-up of Thyroid Nodule    HPI:   Jessica Bishop is a 63 y.o. female    1. Thyroid Nodule:   History of Thyroid Nodule diagnosed December 2021and is here for follow-up  Patient failed to follow-up due to life constrains, and was not able to come for a follow-up appointment until now    Thyroid US from December 3, 2021 was reviewed and discussed with the patient in detail.    This showed a 1.03 cm thyroid nodule on the right lateral thyroid lobe, solid, isoechoic, taller than wide, TR 5  Thyroid gland was heterogeneous with normal vascularity    FNA biopsy was ordered and done on 3/30/2022 unsuccessfully-3/30/2022 patient was told that thyroid nodule was too small and could not be found  On last appointment patient was agreeable to repeat thyroid ultrasound    Thyroid ultrasound done on 7/5/2022 showed:  Nodule #1 located on the right lower thyroid lobe measuring 1.11 cm previously measuring 1.03 cm solid isoechoic, taller than wide, with peripheral echogenicity TR 5    FNA biopsy was ordered on last appointment but the radiology department needed a more updated ultrasound  Ultrasound was done on 9/6/2023 showing no changes to her thyroid nodules  FNA biopsy schedule                        She denies lumps or enlargement in the neck.   Denies hoarseness, dysphagia, dyspnea, anterior neck pain and anterior neck swelling.       She reports a family history of goiter.    She denies a history of radiation exposure to head or neck.    She reports fatigue related she feels this is relating to eating less carb to reduce her weight, weight gain related to food, sweating     She denies history of taking thyroid medications.        Latest Reference Range & Units 08/22/23 10:43   TSH 0.380 - 5.330 uIU/mL 0.590   Free T-4 0.93 - 1.70 ng/dL 1.43     3.  Hashimoto's disease:  Elevated antibody levels   Ref. Range 4/2/2022 11:10   Microsomal -Tpo- Abs Latest Ref Range: 0.0 - 9.0 IU/mL 105.0 (H)  "  Anti-Thyroglobulin Latest Ref Range: 0.0 - 4.0 IU/mL <0.9       3.  Vitamin D deficiency:  Currently taking 5000IUs daily    Latest Reference Range & Units 07/14/23 09:54   25-Hydroxy   Vitamin D 25 30 - 100 ng/mL 61     4.  Prediabetes:  Follow-up PCP  Treated with lifestyle modifications    Latest Reference Range & Units 07/14/23 07:36   Glycohemoglobin 4.0 - 5.6 % 6.0 (H)   Estim. Avg Glu mg/dL 126   Fasting Status  Fasting     5. Other fatigue:   Reports that she is eating a bit more carbs and her energy is improved as well as her other symptoms  She hit menopause at age of 55      Latest Reference Range & Units 08/22/23 10:43   Ionized Calcium 1.1 - 1.3 mmol/L 1.2        Latest Reference Range & Units 08/22/23 10:43   Cortisol 0.0 - 23.0 ug/dL 7.4      Latest Reference Range & Units 08/22/23 10:43   17 Alpha Hydroxyprogest <=206.00 ng/dL 30.55      Latest Reference Range & Units 08/22/23 10:43   Acth 7.2 - 63.3 pg/mL 7.3   Aldos Serum ng/dL 9.9   IGF1 38 - 244 ng/mL 223   IGF-1 Z Score Calculation  1.4   Prolactin 2.80 - 26.00 ng/mL 6.82   Pth, Intact 14.0 - 72.0 pg/mL 44.2   Renin Activity ng/mL/hr 0.7     6. Overweight:   Exercise: Gym 2-3x/week and plays picke ball   Diet: healthy in general   8/3/2023   1330 9/8/2023   0911 Most Recent Value     Weight: 84.5 kg (186 lb 3.2 oz) 85.9 kg (189 lb 4.8 oz) 85.9 kg (189 lb 4.8 oz)  as of 9/8/2023    Body Mass Index:   28.78 kg/m² Abnormal    1.727 m (5' 8\")  as of 9/8/2023   85.9 kg (189 lb 4.8 oz)  as of 9/8/2023        Patient's medications, allergies, and social histories were reviewed and updated as appropriate.      ROS:      CONS:     No fever, no chills, no weight loss, no fatigue   EYES:      No diplopia, no blurry vision, no redness of eyes, no swelling of eyelids   ENT:    No hearing loss, No ear pain, No sore throat, no dysphagia, no neck swelling   CV:     No chest pain, no palpitations, no claudication, no orthopnea, no PND   PULM:    No SOB, no " cough, no hemoptysis, no wheezing    GI:   No nausea, no vomiting, no diarrhea, no constipation, no bloody stools   :  Passing urine well, no dysuria, no hematuria   ENDO:   No polyuria, no polydipsia, no heat intolerance, no cold intolerance   NEURO: No headaches, no dizziness, no convulsions, no tremors   MUSC:  No joint swellings, no arthralgias, no myalgias, no weakness   SKIN:   No rash, no ulcers, no dry skin   PSYCH:   No depression, no anxiety, no difficulty sleeping       Past Medical History:  Patient Active Problem List    Diagnosis Date Noted    Thyroid nodule 04/26/2023    Low vitamin D level 12/27/2021    Hypercalcemia 12/04/2020    Erythrocytosis 11/05/2020    Hyperlipidemia 04/11/2019       Past Surgical History:  Past Surgical History:   Procedure Laterality Date    HYSTERECTOMY, TOTAL ABDOMINAL      ovaries remain; for endometriosis     TONSILLECTOMY          Allergies:  Patient has no known allergies.     Current Medications:    Current Outpatient Medications:     atorvastatin (LIPITOR) 20 MG Tab, Take 1 Tablet by mouth every day., Disp: 90 Tablet, Rfl: 1    Social History:  Social History     Socioeconomic History    Marital status: Single     Spouse name: Not on file    Number of children: Not on file    Years of education: Not on file    Highest education level: Bachelor's degree (e.g., BA, AB, BS)   Occupational History    Not on file   Tobacco Use    Smoking status: Former     Current packs/day: 0.00     Average packs/day: 1 pack/day for 20.0 years (20.0 ttl pk-yrs)     Types: Cigarettes     Start date: 1988     Quit date: 2008     Years since quitting: 15.8    Smokeless tobacco: Never   Vaping Use    Vaping Use: Former   Substance and Sexual Activity    Alcohol use: Yes     Comment: Occ    Drug use: Yes     Types: Marijuana     Comment: Gummy    Sexual activity: Not on file   Other Topics Concern    Not on file   Social History Narrative    /      Social  "Determinants of Health     Financial Resource Strain: Medium Risk (4/26/2023)    Overall Financial Resource Strain (CARDIA)     Difficulty of Paying Living Expenses: Somewhat hard   Food Insecurity: No Food Insecurity (4/26/2023)    Hunger Vital Sign     Worried About Running Out of Food in the Last Year: Never true     Ran Out of Food in the Last Year: Never true   Transportation Needs: No Transportation Needs (4/26/2023)    PRAPARE - Transportation     Lack of Transportation (Medical): No     Lack of Transportation (Non-Medical): No   Physical Activity: Insufficiently Active (4/26/2023)    Exercise Vital Sign     Days of Exercise per Week: 3 days     Minutes of Exercise per Session: 30 min   Stress: Stress Concern Present (4/26/2023)    [employer] [place] [employer] - Occupational Stress Questionnaire     Feeling of Stress : To some extent   Social Connections: Socially Isolated (4/26/2023)    Social Connection and Isolation Panel [NHANES]     Frequency of Communication with Friends and Family: Once a week     Frequency of Social Gatherings with Friends and Family: Never     Attends Jehovah's witness Services: Never     Active Member of Clubs or Organizations: No     Attends Club or Organization Meetings: Never     Marital Status: Living with partner   Intimate Partner Violence: Not on file   Housing Stability: Low Risk  (4/26/2023)    Housing Stability Vital Sign     Unable to Pay for Housing in the Last Year: No     Number of Places Lived in the Last Year: 1     Unstable Housing in the Last Year: No        Family History:   Family History   Problem Relation Age of Onset    Stroke Mother     Heart Disease Mother     Lung Disease Father         COPD, smoker     Heart Disease Maternal Grandmother     Cancer Paternal Grandmother     Cancer Paternal Grandfather          PHYSICAL EXAM:   Vital signs: BP (!) 136/90   Pulse 94   Ht 1.727 m (5' 8\")   Wt 85.3 kg (188 lb)   SpO2 96%   BMI 28.59 kg/m²   GENERAL: " Well-developed, well-nourished  in no apparent distress.    SKIN: No rashes, lesions. Turgor is normal.    Labs:  Lab Results   Component Value Date/Time    WBC 7.1 03/13/2023 09:11 AM    RBC 5.38 03/13/2023 09:11 AM    HEMOGLOBIN 16.4 (H) 03/13/2023 09:11 AM    MCV 91.3 03/13/2023 09:11 AM    MCH 30.5 03/13/2023 09:11 AM    MCHC 33.4 (L) 03/13/2023 09:11 AM    RDW 44.2 03/13/2023 09:11 AM    MPV 10.7 03/13/2023 09:11 AM       Lab Results   Component Value Date/Time    SODIUM 141 07/14/2023 07:36 AM    POTASSIUM 4.5 07/14/2023 07:36 AM    CHLORIDE 105 07/14/2023 07:36 AM    CO2 24 07/14/2023 07:36 AM    ANION 12.0 07/14/2023 07:36 AM    GLUCOSE 114 (H) 07/14/2023 07:36 AM    BUN 18 07/14/2023 07:36 AM    CREATININE 0.85 07/14/2023 07:36 AM    CALCIUM 9.9 08/22/2023 10:43 AM    ASTSGOT 10 (L) 07/14/2023 07:36 AM    ALTSGPT 27 07/14/2023 07:36 AM    TBILIRUBIN 0.3 07/14/2023 07:36 AM    ALBUMIN 4.7 07/14/2023 07:36 AM    TOTPROTEIN 7.5 07/14/2023 07:36 AM    GLOBULIN 2.8 07/14/2023 07:36 AM    AGRATIO 1.7 07/14/2023 07:36 AM       Lab Results   Component Value Date/Time    TSHULTRASEN 1.960 12/15/2021 0638     Lab Results   Component Value Date/Time    FREET4 0.82 10/28/2014 0630     Imaging:    Thyroid ultrasound done on 7/5/2022 showed:  Nodule #1 located on the right lower thyroid lobe measuring 1.11 cm previously measuring 1.03 cm solid isoechoic, taller than wide, with peripheral echogenicity TR 5    ASSESSMENT/PLAN:   1. Thyroid nodule  Stable with benign nodule  US will be done on 9/2024    2. Hashimoto's disease  This is the etiology of Diagnosis #1    3. Vitamin D deficiency  - Stable  -Continue regimen-see HPI    4. Prediabetes  - Stable  -Continue regimen-see HPI  --- Exercise for least 150 minutes/week  - Stable hydration  - Maintain a healthy diet, minimal carbohydrate, portion control     5. Other fatigue  Unstable  Based on unpecific cortisol we will order an [facility] stim test-sent to [facility] center      6. Overweight (BMI 25.0-29.9)  Unstable  - Exercise for least 150 minutes/week  - Stable hydration  - Maintain a healthy diet, minimal carbohydrate, portion control I      Disposition: Follow-up in Nov 28th   ACTH stim test       Thank you kindly for allowing me to participate in the thyroid care plan for this patient.        Ruddy Beckwith A.P.R.N.  10/25/23        CC:   Claudia Lynne M.D.

## 2023-10-31 RX ORDER — ATORVASTATIN CALCIUM 20 MG/1
20 TABLET, FILM COATED ORAL DAILY
Qty: 90 TABLET | Refills: 1 | Status: SHIPPED | OUTPATIENT
Start: 2023-10-31

## 2023-11-10 ENCOUNTER — OFFICE VISIT (OUTPATIENT)
Dept: MEDICAL GROUP | Facility: PHYSICIAN GROUP | Age: 63
End: 2023-11-10
Payer: COMMERCIAL

## 2023-11-10 VITALS
TEMPERATURE: 97.4 F | HEIGHT: 68 IN | BODY MASS INDEX: 28.52 KG/M2 | DIASTOLIC BLOOD PRESSURE: 78 MMHG | WEIGHT: 188.2 LBS | HEART RATE: 68 BPM | SYSTOLIC BLOOD PRESSURE: 134 MMHG | OXYGEN SATURATION: 96 % | RESPIRATION RATE: 16 BRPM

## 2023-11-10 DIAGNOSIS — E04.1 THYROID NODULE: ICD-10-CM

## 2023-11-10 DIAGNOSIS — R53.83 OTHER FATIGUE: ICD-10-CM

## 2023-11-10 DIAGNOSIS — E78.5 HYPERLIPIDEMIA, UNSPECIFIED HYPERLIPIDEMIA TYPE: ICD-10-CM

## 2023-11-10 DIAGNOSIS — D75.1 ERYTHROCYTOSIS: Chronic | ICD-10-CM

## 2023-11-10 PROCEDURE — 3075F SYST BP GE 130 - 139MM HG: CPT | Performed by: FAMILY MEDICINE

## 2023-11-10 PROCEDURE — 3078F DIAST BP <80 MM HG: CPT | Performed by: FAMILY MEDICINE

## 2023-11-10 PROCEDURE — 99214 OFFICE O/P EST MOD 30 MIN: CPT | Performed by: FAMILY MEDICINE

## 2023-11-10 ASSESSMENT — FIBROSIS 4 INDEX: FIB4 SCORE: 0.53

## 2023-11-10 NOTE — PROGRESS NOTES
Subjective:     CC:   Chief Complaint   Patient presents with    Follow-Up       HPI:   Jessica presents today for follow-up.  Patient is seeing endocrinology she starts seeing him due to effect of her thyroid I did do a biopsy and they are planning to carefully follow her up at this point.  Patient is also seeing him due to her fatigue to have ordered a bunch of different labs patient thinks that they are also following her up concerning her hemoglobin A1c.  I would recommend she find out due to fact I would like to see her back in May timeframe I will preorder some labs she should let me know if I need to check her hemoglobin A1c again.    Past Medical History:   Diagnosis Date    Hyperlipidemia 4/11/2019       Social History     Tobacco Use    Smoking status: Former     Current packs/day: 0.00     Average packs/day: 1 pack/day for 20.0 years (20.0 ttl pk-yrs)     Types: Cigarettes     Start date: 1988     Quit date: 2008     Years since quitting: 15.8    Smokeless tobacco: Never   Vaping Use    Vaping Use: Former   Substance Use Topics    Alcohol use: Yes     Comment: Occ    Drug use: Yes     Types: Marijuana     Comment: Gummy       Current Outpatient Medications Ordered in Epic   Medication Sig Dispense Refill    atorvastatin (LIPITOR) 20 MG Tab TAKE 1 TABLET BY MOUTH EVERY DAY 90 Tablet 1     No current Epic-ordered facility-administered medications on file.       Allergies:  Patient has no known allergies.    Health Maintenance: Completed    ROS:  Gen: no fevers/chills, no changes in weight  Eyes: no changes in vision  ENT: no sore throat, no hearing loss, no bloody nose  Pulm: no sob, no cough  CV: no chest pain, no palpitations  GI: no nausea/vomiting, no diarrhea  : no dysuria  Neuro: no headaches, no numbness/tingling  Heme/Lymph: no easy bruising    Objective:     Exam:  /78 (BP Location: Right arm, Patient Position: Sitting, BP Cuff Size: Adult)   Pulse 68   Temp 36.3 °C (97.4 °F) (Temporal)    "Resp 16   Ht 1.727 m (5' 8\")   Wt 85.4 kg (188 lb 3.2 oz)   SpO2 96%   BMI 28.62 kg/m²  Body mass index is 28.62 kg/m².    Gen: Alert and oriented, No apparent distress.  Skin: Warm and dry.  No obvious lesions.  Eyes: Sclera wnl Pupils normal in size  Lungs: Normal effort, CTA bilaterally, no wheezes, rhonchi, or rales  CV: Regular rate and rhythm. No murmurs, rubs, or gallops.  Musculoskeletal: Normal gait. No extremity cyanosis, clubbing, or edema.  Neuro: Oriented to person, place and time  Psych: Mood is wnl       Assessment & Plan:     63 y.o. female with the following -     1. Hyperlipidemia, unspecified hyperlipidemia type  Recommend rechecking this patient very agreeable with the plan  - Comp Metabolic Panel; Future  - Lipid Profile; Future    2. Erythrocytosis  Recommend checking her CBC also  - CBC WITH DIFFERENTIAL; Future    3. Thyroid nodule  Patient is following up with endocrinology for this.    4. Other fatigue  Patient is being seen by endocrine for this       Return in about 6 months (around 5/10/2024), or if symptoms worsen or fail to improve.    Please note that this dictation was created using voice recognition software. I have made every reasonable attempt to correct obvious errors, but I expect that there are errors of grammar and possibly content that I did not discover before finalizing the note.  "

## 2023-11-14 ENCOUNTER — OUTPATIENT INFUSION SERVICES (OUTPATIENT)
Dept: ONCOLOGY | Facility: MEDICAL CENTER | Age: 63
End: 2023-11-14
Payer: COMMERCIAL

## 2023-11-14 VITALS
RESPIRATION RATE: 18 BRPM | TEMPERATURE: 97 F | BODY MASS INDEX: 28.9 KG/M2 | SYSTOLIC BLOOD PRESSURE: 161 MMHG | DIASTOLIC BLOOD PRESSURE: 94 MMHG | HEIGHT: 68 IN | OXYGEN SATURATION: 96 % | HEART RATE: 85 BPM | WEIGHT: 190.7 LBS

## 2023-11-14 DIAGNOSIS — R53.83 OTHER FATIGUE: ICD-10-CM

## 2023-11-14 LAB
CORTIS SERPL-MCNC: 11.7 UG/DL (ref 0–23)
CORTIS SERPL-MCNC: 27.3 UG/DL (ref 0–23)
CORTIS SERPL-MCNC: 32.8 UG/DL (ref 0–23)

## 2023-11-14 PROCEDURE — 36000 PLACE NEEDLE IN VEIN: CPT

## 2023-11-14 PROCEDURE — 700111 HCHG RX REV CODE 636 W/ 250 OVERRIDE (IP): Mod: JZ

## 2023-11-14 PROCEDURE — 82024 ASSAY OF ACTH: CPT

## 2023-11-14 PROCEDURE — 96374 THER/PROPH/DIAG INJ IV PUSH: CPT

## 2023-11-14 PROCEDURE — 82533 TOTAL CORTISOL: CPT

## 2023-11-14 RX ORDER — COSYNTROPIN 0.25 MG/ML
250 INJECTION, POWDER, FOR SOLUTION INTRAMUSCULAR; INTRAVENOUS ONCE
Status: CANCELLED | OUTPATIENT
Start: 2023-11-14 | End: 2023-11-14

## 2023-11-14 RX ORDER — 0.9 % SODIUM CHLORIDE 0.9 %
10 VIAL (ML) INJECTION PRN
OUTPATIENT
Start: 2023-11-14

## 2023-11-14 RX ORDER — 0.9 % SODIUM CHLORIDE 0.9 %
3 VIAL (ML) INJECTION PRN
OUTPATIENT
Start: 2023-11-14

## 2023-11-14 RX ORDER — COSYNTROPIN 0.25 MG/ML
250 INJECTION, POWDER, FOR SOLUTION INTRAMUSCULAR; INTRAVENOUS ONCE
Status: COMPLETED | OUTPATIENT
Start: 2023-11-14 | End: 2023-11-14

## 2023-11-14 RX ORDER — 0.9 % SODIUM CHLORIDE 0.9 %
VIAL (ML) INJECTION PRN
OUTPATIENT
Start: 2023-11-14

## 2023-11-14 RX ADMIN — COSYNTROPIN 250 MCG: 0.25 INJECTION, POWDER, LYOPHILIZED, FOR SOLUTION INTRAMUSCULAR; INTRAVENOUS at 08:40

## 2023-11-14 ASSESSMENT — FIBROSIS 4 INDEX: FIB4 SCORE: 0.53

## 2023-11-14 NOTE — PROGRESS NOTES
Jessica arrived ambulatory to [facility] for a STIM test. 20G PIV established in right ac with brisk blood return. Baseline labs drawn and cosyntropin injected. Serum cortisol levels drawn 30 minutes and 60 minutes post cosyntropin injection. PIV removed with tip intact and site covered with gauze and coban. No future appointments at this time.

## 2023-11-15 LAB — ACTH PLAS-MCNC: 10.1 PG/ML (ref 7.2–63.3)

## 2023-11-30 ENCOUNTER — OFFICE VISIT (OUTPATIENT)
Dept: ENDOCRINOLOGY | Facility: MEDICAL CENTER | Age: 63
End: 2023-11-30
Payer: COMMERCIAL

## 2023-11-30 VITALS
SYSTOLIC BLOOD PRESSURE: 120 MMHG | OXYGEN SATURATION: 96 % | HEIGHT: 68 IN | WEIGHT: 188 LBS | BODY MASS INDEX: 28.49 KG/M2 | HEART RATE: 99 BPM | DIASTOLIC BLOOD PRESSURE: 86 MMHG

## 2023-11-30 DIAGNOSIS — E55.9 VITAMIN D DEFICIENCY: ICD-10-CM

## 2023-11-30 DIAGNOSIS — E11.9 TYPE 2 DIABETES MELLITUS WITHOUT COMPLICATION, WITHOUT LONG-TERM CURRENT USE OF INSULIN (HCC): ICD-10-CM

## 2023-11-30 DIAGNOSIS — E66.3 OVERWEIGHT (BMI 25.0-29.9): ICD-10-CM

## 2023-11-30 DIAGNOSIS — E04.1 THYROID NODULE: ICD-10-CM

## 2023-11-30 DIAGNOSIS — E06.3 HASHIMOTO'S DISEASE: ICD-10-CM

## 2023-11-30 DIAGNOSIS — R53.83 OTHER FATIGUE: ICD-10-CM

## 2023-11-30 PROCEDURE — 3074F SYST BP LT 130 MM HG: CPT

## 2023-11-30 PROCEDURE — 3079F DIAST BP 80-89 MM HG: CPT

## 2023-11-30 PROCEDURE — 99214 OFFICE O/P EST MOD 30 MIN: CPT

## 2023-11-30 PROCEDURE — 99211 OFF/OP EST MAY X REQ PHY/QHP: CPT

## 2023-11-30 RX ORDER — METFORMIN HYDROCHLORIDE 500 MG/1
500 TABLET, EXTENDED RELEASE ORAL DAILY
Qty: 90 TABLET | Refills: 11 | Status: SHIPPED | OUTPATIENT
Start: 2023-11-30

## 2023-11-30 RX ORDER — DEXAMETHASONE 1 MG
1 TABLET ORAL
Qty: 1 TABLET | Refills: 0 | Status: CANCELLED | OUTPATIENT
Start: 2023-11-30

## 2023-11-30 ASSESSMENT — FIBROSIS 4 INDEX: FIB4 SCORE: 0.53

## 2023-11-30 NOTE — PROGRESS NOTES
Chief Complaint: Follow-up of Thyroid Nodule    HPI:   Jessica Bishop is a 63 y.o. female    1. Thyroid Nodule:   History of Thyroid Nodule diagnosed December 2021and is here for follow-up  Patient failed to follow-up due to life constrains, and was not able to come for a follow-up appointment until now    Thyroid US from December 3, 2021 was reviewed and discussed with the patient in detail.    This showed a 1.03 cm thyroid nodule on the right lateral thyroid lobe, solid, isoechoic, taller than wide, TR 5  Thyroid gland was heterogeneous with normal vascularity    FNA biopsy was ordered and done on 3/30/2022 unsuccessfully-3/30/2022 patient was told that thyroid nodule was too small and could not be found  On last appointment patient was agreeable to repeat thyroid ultrasound    Thyroid ultrasound done on 7/5/2022 showed:  Nodule #1 located on the right lower thyroid lobe measuring 1.11 cm previously measuring 1.03 cm solid isoechoic, taller than wide, with peripheral echogenicity TR 5    FNA biopsy was ordered on last appointment but the radiology department needed a more updated ultrasound  Ultrasound was done on 9/6/2023 showing no changes to her thyroid nodules  FNA biopsy schedule                        She denies lumps or enlargement in the neck.   Denies hoarseness, dysphagia, dyspnea, anterior neck pain and anterior neck swelling.       She reports a family history of goiter.    She denies a history of radiation exposure to head or neck.    She reports fatigue related she feels this is relating to eating less carb to reduce her weight, weight gain related to food, sweating     She denies history of taking thyroid medications.        Latest Reference Range & Units 08/22/23 10:43   TSH 0.380 - 5.330 uIU/mL 0.590   Free T-4 0.93 - 1.70 ng/dL 1.43     3.  Hashimoto's disease:  Elevated antibody levels   Ref. Range 4/2/2022 11:10   Microsomal -Tpo- Abs Latest Ref Range: 0.0 - 9.0 IU/mL 105.0 (H)  "  Anti-Thyroglobulin Latest Ref Range: 0.0 - 4.0 IU/mL <0.9       3.  Vitamin D deficiency:  Currently taking 5000IUs daily vit D3    Latest Reference Range & Units 07/14/23 09:54   25-Hydroxy   Vitamin D 25 30 - 100 ng/mL 61     4.  Type 2 Diabetes:  Follow-up PCP  Treated with lifestyle modifications   Diet: Healthy in general, salads, minimal fats, protein high, low carbs   Exercise: minimal due to weather      Latest Reference Range & Units 07/14/23 07:36   Glycohemoglobin 4.0 - 5.6 % 6.0 (H)   Estim. Avg Glu mg/dL 126   Fasting Status  Fasting     5. Other fatigue:   Reports that she is eating a bit more carbs and her energy is improved as well as her other symptoms  She hit menopause at age of 55      Latest Reference Range & Units 11/14/23 08:21 11/14/23 09:10 11/14/23 09:40   Cortisol 0.0 - 23.0 ug/dL 11.7 27.3 (H) 32.8 (H)      Latest Reference Range & Units 08/22/23 10:43   Ionized Calcium 1.1 - 1.3 mmol/L 1.2        Latest Reference Range & Units 08/22/23 10:43   Cortisol 0.0 - 23.0 ug/dL 7.4      Latest Reference Range & Units 08/22/23 10:43   17 Alpha Hydroxyprogest <=206.00 ng/dL 30.55      Latest Reference Range & Units 08/22/23 10:43   Acth 7.2 - 63.3 pg/mL 7.3   Aldos Serum ng/dL 9.9   IGF1 38 - 244 ng/mL 223   IGF-1 Z Score Calculation  1.4   Prolactin 2.80 - 26.00 ng/mL 6.82   Pth, Intact 14.0 - 72.0 pg/mL 44.2   Renin Activity ng/mL/hr 0.7     6. Overweight:   Exercise: Gym 2-3x/week and plays picke ball   Diet: healthy in general     11/10/2023   1010251/14/2023   87770011/30/2023   0821Most Recent Value Weight:85.4 kg (188 lb 3.2 oz)86.5 kg (190 lb 11.2 oz)85.3 kg (188 lb)85.3 kg (188 lb)  as of 11/30/2023 Body Mass Index:28.59 kg/m² Abnormal    1.727 m (5' 8\")  as of 11/30/2023   85.3 kg (188 lb)  as of 11/30/2023     8/3/2023   1330 9/8/2023   0911 Most Recent Value     Weight: 84.5 kg (186 lb 3.2 oz) 85.9 kg (189 lb 4.8 oz) 85.9 kg (189 lb 4.8 oz)  as of 9/8/2023    Body Mass Index:   28.78 " "kg/m² Abnormal    1.727 m (5' 8\")  as of 9/8/2023   85.9 kg (189 lb 4.8 oz)  as of 9/8/2023        Patient's medications, allergies, and social histories were reviewed and updated as appropriate.      ROS:      CONS:     No fever, no chills, no weight loss, no fatigue   EYES:      No diplopia, no blurry vision, no redness of eyes, no swelling of eyelids   ENT:    No hearing loss, No ear pain, No sore throat, no dysphagia, no neck swelling   CV:     No chest pain, no palpitations, no claudication, no orthopnea, no PND   PULM:    No SOB, no cough, no hemoptysis, no wheezing    GI:   No nausea, no vomiting, no diarrhea, no constipation, no bloody stools   :  Passing urine well, no dysuria, no hematuria   ENDO:   No polyuria, no polydipsia, no heat intolerance, no cold intolerance   NEURO: No headaches, no dizziness, no convulsions, no tremors   MUSC:  No joint swellings, no arthralgias, no myalgias, no weakness   SKIN:   No rash, no ulcers, no dry skin   PSYCH:   No depression, no anxiety, no difficulty sleeping       Past Medical History:  Patient Active Problem List    Diagnosis Date Noted    Other fatigue 10/25/2023    Thyroid nodule 04/26/2023    Low vitamin D level 12/27/2021    Hypercalcemia 12/04/2020    Erythrocytosis 11/05/2020    Hyperlipidemia 04/11/2019       Past Surgical History:  Past Surgical History:   Procedure Laterality Date    HYSTERECTOMY, TOTAL ABDOMINAL      ovaries remain; for endometriosis     TONSILLECTOMY          Allergies:  Patient has no known allergies.     Current Medications:    Current Outpatient Medications:     atorvastatin (LIPITOR) 20 MG Tab, TAKE 1 TABLET BY MOUTH EVERY DAY, Disp: 90 Tablet, Rfl: 1    Social History:  Social History     Socioeconomic History    Marital status: Single     Spouse name: Not on file    Number of children: Not on file    Years of education: Not on file    Highest education level: Bachelor's degree (e.g., BA, AB, BS)   Occupational History    Not on " file   Tobacco Use    Smoking status: Former     Current packs/day: 0.00     Average packs/day: 1 pack/day for 20.0 years (20.0 ttl pk-yrs)     Types: Cigarettes     Start date: 1988     Quit date: 2008     Years since quitting: 15.9    Smokeless tobacco: Never   Vaping Use    Vaping Use: Former   Substance and Sexual Activity    Alcohol use: Yes     Comment: Occ    Drug use: Yes     Types: Marijuana     Comment: Gummy    Sexual activity: Not on file   Other Topics Concern    Not on file   Social History Narrative    /      Social Determinants of Health     Financial Resource Strain: Medium Risk (4/26/2023)    Overall Financial Resource Strain (CARDIA)     Difficulty of Paying Living Expenses: Somewhat hard   Food Insecurity: No Food Insecurity (4/26/2023)    Hunger Vital Sign     Worried About Running Out of Food in the Last Year: Never true     Ran Out of Food in the Last Year: Never true   Transportation Needs: No Transportation Needs (4/26/2023)    PRAPARE - Transportation     Lack of Transportation (Medical): No     Lack of Transportation (Non-Medical): No   Physical Activity: Insufficiently Active (4/26/2023)    Exercise Vital Sign     Days of Exercise per Week: 3 days     Minutes of Exercise per Session: 30 min   Stress: Stress Concern Present (4/26/2023)    [employer] [place] [employer] - Occupational Stress Questionnaire     Feeling of Stress : To some extent   Social Connections: Socially Isolated (4/26/2023)    Social Connection and Isolation Panel [NHANES]     Frequency of Communication with Friends and Family: Once a week     Frequency of Social Gatherings with Friends and Family: Never     Attends Spiritism Services: Never     Active Member of Clubs or Organizations: No     Attends Club or Organization Meetings: Never     Marital Status: Living with partner   Intimate Partner Violence: Not on file   Housing Stability: Low Risk  (4/26/2023)    Housing  "Stability Vital Sign     Unable to Pay for Housing in the Last Year: No     Number of Places Lived in the Last Year: 1     Unstable Housing in the Last Year: No        Family History:   Family History   Problem Relation Age of Onset    Stroke Mother     Heart Disease Mother     Lung Disease Father         COPD, smoker     Heart Disease Maternal Grandmother     Cancer Paternal Grandmother     Cancer Paternal Grandfather          PHYSICAL EXAM:   Vital signs: /86 (BP Location: Left arm, Patient Position: Sitting, BP Cuff Size: Adult)   Pulse 99   Ht 1.727 m (5' 8\")   Wt 85.3 kg (188 lb)   SpO2 96%   BMI 28.59 kg/m²   GENERAL: Well-developed, well-nourished  in no apparent distress.    SKIN: No rashes, lesions. Turgor is normal.    Labs:  Lab Results   Component Value Date/Time    WBC 7.1 03/13/2023 09:11 AM    RBC 5.38 03/13/2023 09:11 AM    HEMOGLOBIN 16.4 (H) 03/13/2023 09:11 AM    MCV 91.3 03/13/2023 09:11 AM    MCH 30.5 03/13/2023 09:11 AM    MCHC 33.4 (L) 03/13/2023 09:11 AM    RDW 44.2 03/13/2023 09:11 AM    MPV 10.7 03/13/2023 09:11 AM       Lab Results   Component Value Date/Time    SODIUM 141 07/14/2023 07:36 AM    POTASSIUM 4.5 07/14/2023 07:36 AM    CHLORIDE 105 07/14/2023 07:36 AM    CO2 24 07/14/2023 07:36 AM    ANION 12.0 07/14/2023 07:36 AM    GLUCOSE 114 (H) 07/14/2023 07:36 AM    BUN 18 07/14/2023 07:36 AM    CREATININE 0.85 07/14/2023 07:36 AM    CALCIUM 9.9 08/22/2023 10:43 AM    ASTSGOT 10 (L) 07/14/2023 07:36 AM    ALTSGPT 27 07/14/2023 07:36 AM    TBILIRUBIN 0.3 07/14/2023 07:36 AM    ALBUMIN 4.7 07/14/2023 07:36 AM    TOTPROTEIN 7.5 07/14/2023 07:36 AM    GLOBULIN 2.8 07/14/2023 07:36 AM    AGRATIO 1.7 07/14/2023 07:36 AM       Lab Results   Component Value Date/Time    TSHULTRASEN 1.960 12/15/2021 0638     Lab Results   Component Value Date/Time    FREET4 0.82 10/28/2014 0630     Imaging:    Thyroid ultrasound done on 7/5/2022 showed:  Nodule #1 located on the right lower thyroid " lobe measuring 1.11 cm previously measuring 1.03 cm solid isoechoic, taller than wide, with peripheral echogenicity TR 5    ASSESSMENT/PLAN:   1. Thyroid nodule  Stable with benign nodule  US will be done on 9/2024    2. Hashimoto's disease  This is the etiology of Diagnosis #1    3. Vitamin D deficiency  - Stable  -Continue regimen-see HPI    4. Type 2 diabetes mellitus without complication, without long-term current use of insulin (HCC)  Unstable with an A1c 6%  - Exercise for least 150 minutes/week-pt will try to join the gym  - Stable hydration  - Daily feet check  - Maintain a healthy diet, minimal carbohydrate, portion control   --Metformin  mg sent to pharmacy, side effects discussed    - metFORMIN ER (GLUCOPHAGE XR) 500 MG TABLET SR 24 HR; Take 1 Tablet by mouth every day.  Dispense: 90 Tablet; Refill: 11  - PINKY-65; Future  - C-PEPTIDE; Future  - IA-2 AUTOANTIBODIES  - INSULIN FASTING; Future     5. Other fatigue  Unstable  Neg for adrenal insufficieny   We will treat for diagnosis #4     6. Overweight (BMI 25.0-29.9)  Unstable  - Exercise for least 150 minutes/week-pt will try to join the gym  - Stable hydration  - Daily feet check    Disposition: Follow-up in January 23rd   Do blood work 2 weeks before appt      Thank you kindly for allowing me to participate in the thyroid care plan for this patient.        [email or web address].  11/30/23          CC:   Claudia Lynne M.D.

## 2024-01-08 ENCOUNTER — HOSPITAL ENCOUNTER (OUTPATIENT)
Dept: LAB | Facility: MEDICAL CENTER | Age: 64
End: 2024-01-08
Payer: COMMERCIAL

## 2024-01-08 DIAGNOSIS — E11.9 TYPE 2 DIABETES MELLITUS WITHOUT COMPLICATION, WITHOUT LONG-TERM CURRENT USE OF INSULIN (HCC): ICD-10-CM

## 2024-01-08 PROCEDURE — 86341 ISLET CELL ANTIBODY: CPT

## 2024-01-08 PROCEDURE — 84681 ASSAY OF C-PEPTIDE: CPT

## 2024-01-08 PROCEDURE — 83525 ASSAY OF INSULIN: CPT

## 2024-01-08 PROCEDURE — 36415 COLL VENOUS BLD VENIPUNCTURE: CPT

## 2024-01-09 LAB
C PEPTIDE SERPL-MCNC: 2.6 NG/ML (ref 0.5–3.3)
INSULIN P FAST SERPL-ACNC: 13 UIU/ML (ref 3–25)

## 2024-01-10 LAB
GAD65 AB SER IA-ACNC: <5 IU/ML (ref 0–5)
ISLET CELL512 AB SER IA-ACNC: <5.4 U/ML (ref 0–7.4)

## 2024-01-23 ENCOUNTER — OFFICE VISIT (OUTPATIENT)
Dept: ENDOCRINOLOGY | Facility: MEDICAL CENTER | Age: 64
End: 2024-01-23
Payer: COMMERCIAL

## 2024-01-23 VITALS
DIASTOLIC BLOOD PRESSURE: 80 MMHG | OXYGEN SATURATION: 93 % | HEIGHT: 68 IN | SYSTOLIC BLOOD PRESSURE: 126 MMHG | HEART RATE: 91 BPM | WEIGHT: 188.6 LBS | BODY MASS INDEX: 28.58 KG/M2

## 2024-01-23 DIAGNOSIS — R53.83 OTHER FATIGUE: ICD-10-CM

## 2024-01-23 DIAGNOSIS — E66.3 OVERWEIGHT (BMI 25.0-29.9): ICD-10-CM

## 2024-01-23 DIAGNOSIS — E55.9 VITAMIN D DEFICIENCY: ICD-10-CM

## 2024-01-23 DIAGNOSIS — E11.9 TYPE 2 DIABETES MELLITUS WITHOUT COMPLICATION, WITHOUT LONG-TERM CURRENT USE OF INSULIN (HCC): ICD-10-CM

## 2024-01-23 DIAGNOSIS — E06.3 HASHIMOTO'S DISEASE: ICD-10-CM

## 2024-01-23 DIAGNOSIS — E04.1 THYROID NODULE: ICD-10-CM

## 2024-01-23 PROCEDURE — 3074F SYST BP LT 130 MM HG: CPT

## 2024-01-23 PROCEDURE — 3079F DIAST BP 80-89 MM HG: CPT

## 2024-01-23 PROCEDURE — 99214 OFFICE O/P EST MOD 30 MIN: CPT

## 2024-01-23 PROCEDURE — 99211 OFF/OP EST MAY X REQ PHY/QHP: CPT

## 2024-01-23 RX ORDER — TIRZEPATIDE 2.5 MG/.5ML
2.5 INJECTION, SOLUTION SUBCUTANEOUS
Qty: 6 ML | Refills: 11 | Status: SHIPPED | OUTPATIENT
Start: 2024-01-23 | End: 2024-03-18 | Stop reason: SDUPTHER

## 2024-01-23 ASSESSMENT — FIBROSIS 4 INDEX: FIB4 SCORE: 0.53

## 2024-01-23 NOTE — PROGRESS NOTES
Chief Complaint: Follow-up of Thyroid Nodule    HPI:   Jessica Bishop is a 63 y.o. female    1. Thyroid Nodule:   History of Thyroid Nodule diagnosed December 2021and is here for follow-up  Patient failed to follow-up due to life constrains, and was not able to come for a follow-up appointment until now    Thyroid US from December 3, 2021 was reviewed and discussed with the patient in detail.    This showed a 1.03 cm thyroid nodule on the right lateral thyroid lobe, solid, isoechoic, taller than wide, TR 5  Thyroid gland was heterogeneous with normal vascularity    FNA biopsy was ordered and done on 3/30/2022 unsuccessfully-3/30/2022 patient was told that thyroid nodule was too small and could not be found  On last appointment patient was agreeable to repeat thyroid ultrasound    Thyroid ultrasound done on 7/5/2022 showed:  Nodule #1 located on the right lower thyroid lobe measuring 1.11 cm previously measuring 1.03 cm solid isoechoic, taller than wide, with peripheral echogenicity TR 5    FNA biopsy was ordered on last appointment but the radiology department needed a more updated ultrasound  Ultrasound was done on 9/6/2023 showing no changes to her thyroid nodules  FNA biopsy schedule                        Denies hoarseness, dysphagia, dyspnea, anterior neck pain and anterior neck swelling.       She reports a family history of goiter.    She denies a history of radiation exposure to head or neck.    She reports fatigue related she feels this is relating to eating less carb to reduce her weight, weight gain related to food, sweating     She denies history of taking thyroid medications.        Latest Reference Range & Units 08/22/23 10:43   TSH 0.380 - 5.330 uIU/mL 0.590   Free T-4 0.93 - 1.70 ng/dL 1.43     3.  Hashimoto's disease:  Elevated antibody levels   Ref. Range 4/2/2022 11:10   Microsomal -Tpo- Abs Latest Ref Range: 0.0 - 9.0 IU/mL 105.0 (H)   Anti-Thyroglobulin Latest Ref Range: 0.0 - 4.0 IU/mL <0.9        3.  Vitamin D deficiency:  Currently taking 5000IUs daily vit D3    Latest Reference Range & Units 07/14/23 09:54   25-Hydroxy   Vitamin D 25 30 - 100 ng/mL 61     4.  Type 2 Diabetes:  Diet: Healthy in general, salads, minimal fats, protein high, low carbs   Exercise: minimal due to weather     Currently taking: Metformin 500 mg daily    Latest Reference Range & Units 01/08/24 08:27 01/08/24 08:28   C-Peptide 0.5 - 3.3 ng/mL 2.6    IA-2, Autoantibody 0.0 - 7.4 U/mL  <5.4      Latest Reference Range & Units 01/08/24 08:27   PINKY Antibody 0.0 - 5.0 IU/mL <5.0     High insulin resistance    Latest Reference Range & Units 01/08/24 08:27   Insulin Fasting 3 - 25 uIU/mL 13      Latest Reference Range & Units 07/14/23 07:36   Glucose 65 - 99 mg/dL 114 (H)          Latest Reference Range & Units 07/14/23 07:36   Glycohemoglobin 4.0 - 5.6 % 6.0 (H)   Estim. Avg Glu mg/dL 126   Fasting Status  Fasting     5. Other fatigue:   Reports that she is eating a bit more carbs and her energy is improved as well as her other symptoms  She hit menopause at age of 55   Neg for adrenal insufficieny        Latest Reference Range & Units 11/14/23 08:21 11/14/23 09:10 11/14/23 09:40   Cortisol 0.0 - 23.0 ug/dL 11.7 27.3 (H) 32.8 (H)      Latest Reference Range & Units 08/22/23 10:43   Ionized Calcium 1.1 - 1.3 mmol/L 1.2        Latest Reference Range & Units 08/22/23 10:43   Cortisol 0.0 - 23.0 ug/dL 7.4      Latest Reference Range & Units 08/22/23 10:43   17 Alpha Hydroxyprogest <=206.00 ng/dL 30.55      Latest Reference Range & Units 08/22/23 10:43   Acth 7.2 - 63.3 pg/mL 7.3   Aldos Serum ng/dL 9.9   IGF1 38 - 244 ng/mL 223   IGF-1 Z Score Calculation  1.4   Prolactin 2.80 - 26.00 ng/mL 6.82   Pth, Intact 14.0 - 72.0 pg/mL 44.2   Renin Activity ng/mL/hr 0.7     6. Overweight:   Exercise: Gym 2-3x/week and plays picke ball   Diet: healthy in general         11/14/2023  0809 11/30/2023  0821 1/23/2024  1326 Most Recent Value   "  Weight: 86.5 kg (190 lb 11.2 oz) 85.3 kg (188 lb) 85.5 kg (188 lb 9.6 oz) 85.5 kg (188 lb 9.6 oz)  as of 1/23/2024   Body Mass Index:    28.68 kg/m² Abnormal   1.727 m (5' 8\")  as of 1/23/2024  85.5 kg (188 lb 9.6 oz)  as of 1/23/2024       11/10/2023   332261/14/2023   85406011/30/2023   0821Most Recent Value Weight:85.4 kg (188 lb 3.2 oz)86.5 kg (190 lb 11.2 oz)85.3 kg (188 lb)85.3 kg (188 lb)  as of 11/30/2023 Body Mass Index:28.59 kg/m² Abnormal    1.727 m (5' 8\")  as of 11/30/2023   85.3 kg (188 lb)  as of 11/30/2023       Patient's medications, allergies, and social histories were reviewed and updated as appropriate.  ROS:      CONS:     No fever, no chills, no weight loss, no fatigue   EYES:      No diplopia, no blurry vision, no redness of eyes, no swelling of eyelids   ENT:    No hearing loss, No ear pain, No sore throat, no dysphagia, no neck swelling   CV:     No chest pain, no palpitations, no claudication, no orthopnea, no PND   PULM:    No SOB, no cough, no hemoptysis, no wheezing    GI:   No nausea, no vomiting, no diarrhea, no constipation, no bloody stools   :  Passing urine well, no dysuria, no hematuria   ENDO:   No polyuria, no polydipsia, no heat intolerance, no cold intolerance   NEURO: No headaches, no dizziness, no convulsions, no tremors   MUSC:  No joint swellings, no arthralgias, no myalgias, no weakness   SKIN:   No rash, no ulcers, no dry skin   PSYCH:   No depression, no anxiety, no difficulty sleeping       Past Medical History:  Patient Active Problem List    Diagnosis Date Noted    Other fatigue 10/25/2023    Thyroid nodule 04/26/2023    Low vitamin D level 12/27/2021    Hypercalcemia 12/04/2020    Erythrocytosis 11/05/2020    Hyperlipidemia 04/11/2019       Past Surgical History:  Past Surgical History:   Procedure Laterality Date    HYSTERECTOMY, TOTAL ABDOMINAL      ovaries remain; for endometriosis     TONSILLECTOMY          Allergies:  Patient has no known allergies. "     Current Medications:    Current Outpatient Medications:     metFORMIN ER (GLUCOPHAGE XR) 500 MG TABLET SR 24 HR, Take 1 Tablet by mouth every day., Disp: 90 Tablet, Rfl: 11    atorvastatin (LIPITOR) 20 MG Tab, TAKE 1 TABLET BY MOUTH EVERY DAY, Disp: 90 Tablet, Rfl: 1    Social History:  Social History     Socioeconomic History    Marital status: Single     Spouse name: Not on file    Number of children: Not on file    Years of education: Not on file    Highest education level: Bachelor's degree (e.g., BA, AB, BS)   Occupational History    Not on file   Tobacco Use    Smoking status: Former     Current packs/day: 0.00     Average packs/day: 1 pack/day for 20.0 years (20.0 ttl pk-yrs)     Types: Cigarettes     Start date:      Quit date:      Years since quittin.0    Smokeless tobacco: Never   Vaping Use    Vaping Use: Former   Substance and Sexual Activity    Alcohol use: Yes     Comment: Occ    Drug use: Yes     Types: Marijuana     Comment: Gummy    Sexual activity: Not on file   Other Topics Concern    Not on file   Social History Narrative    /      Social Determinants of Health     Financial Resource Strain: Medium Risk (2023)    Overall Financial Resource Strain (CARDIA)     Difficulty of Paying Living Expenses: Somewhat hard   Food Insecurity: No Food Insecurity (2023)    Hunger Vital Sign     Worried About Running Out of Food in the Last Year: Never true     Ran Out of Food in the Last Year: Never true   Transportation Needs: No Transportation Needs (2023)    PRAPARE - Transportation     Lack of Transportation (Medical): No     Lack of Transportation (Non-Medical): No   Physical Activity: Insufficiently Active (2023)    Exercise Vital Sign     Days of Exercise per Week: 3 days     Minutes of Exercise per Session: 30 min   Stress: Stress Concern Present (2023)    [employer] [employer] - Occupational Stress  "Questionnaire     Feeling of Stress : To some extent   Social Connections: Socially Isolated (4/26/2023)    Social Connection and Isolation Panel [NHANES]     Frequency of Communication with Friends and Family: Once a week     Frequency of Social Gatherings with Friends and Family: Never     Attends Jew Services: Never     Active Member of Clubs or Organizations: No     Attends Club or Organization Meetings: Never     Marital Status: Living with partner   Intimate Partner Violence: Not on file   Housing Stability: Low Risk  (4/26/2023)    Housing Stability Vital Sign     Unable to Pay for Housing in the Last Year: No     Number of Places Lived in the Last Year: 1     Unstable Housing in the Last Year: No        Family History:   Family History   Problem Relation Age of Onset    Stroke Mother     Heart Disease Mother     Lung Disease Father         COPD, smoker     Heart Disease Maternal Grandmother     Cancer Paternal Grandmother     Cancer Paternal Grandfather          PHYSICAL EXAM:   Vital signs: /80 (BP Location: Left arm, Patient Position: Sitting, BP Cuff Size: Adult)   Pulse 91   Ht 1.727 m (5' 8\")   Wt 85.5 kg (188 lb 9.6 oz)   SpO2 93%   BMI 28.68 kg/m²   GENERAL: Well-developed, well-nourished  in no apparent distress.    SKIN: No rashes, lesions. Turgor is normal.    Labs:  Lab Results   Component Value Date/Time    WBC 7.1 03/13/2023 09:11 AM    RBC 5.38 03/13/2023 09:11 AM    HEMOGLOBIN 16.4 (H) 03/13/2023 09:11 AM    MCV 91.3 03/13/2023 09:11 AM    MCH 30.5 03/13/2023 09:11 AM    MCHC 33.4 (L) 03/13/2023 09:11 AM    RDW 44.2 03/13/2023 09:11 AM    MPV 10.7 03/13/2023 09:11 AM       Lab Results   Component Value Date/Time    SODIUM 141 07/14/2023 07:36 AM    POTASSIUM 4.5 07/14/2023 07:36 AM    CHLORIDE 105 07/14/2023 07:36 AM    CO2 24 07/14/2023 07:36 AM    ANION 12.0 07/14/2023 07:36 AM    GLUCOSE 114 (H) 07/14/2023 07:36 AM    BUN 18 07/14/2023 07:36 AM    CREATININE 0.85 07/14/2023 " 07:36 AM    CALCIUM 9.9 08/22/2023 10:43 AM    ASTSGOT 10 (L) 07/14/2023 07:36 AM    ALTSGPT 27 07/14/2023 07:36 AM    TBILIRUBIN 0.3 07/14/2023 07:36 AM    ALBUMIN 4.7 07/14/2023 07:36 AM    TOTPROTEIN 7.5 07/14/2023 07:36 AM    GLOBULIN 2.8 07/14/2023 07:36 AM    AGRATIO 1.7 07/14/2023 07:36 AM       Lab Results   Component Value Date/Time    TSHULTRASEN 1.960 12/15/2021 0638     Lab Results   Component Value Date/Time    FREET4 0.82 10/28/2014 0630     Imaging:    Thyroid ultrasound done on 7/5/2022 showed:  Nodule #1 located on the right lower thyroid lobe measuring 1.11 cm previously measuring 1.03 cm solid isoechoic, taller than wide, with peripheral echogenicity TR 5    ASSESSMENT/PLAN:   1. Thyroid nodule  Stable with benign nodule  US will be done on 9/2024  - US-THYROID; Future  - TSH; Future  - FREE THYROXINE; Future  - Comp Metabolic Panel; Future  --Discussed how to do sub injections     2. Hashimoto's disease  This is the etiology of Diagnosis #1  - US-THYROID; Future  - TSH; Future  - FREE THYROXINE; Future  - Comp Metabolic Panel; Future    3. Vitamin D deficiency  - Stable  -Continue regimen-see HPI  - VITAMIN D,25 HYDROXY (DEFICIENCY); Future    4. Type 2 diabetes mellitus without complication, without long-term current use of insulin (HCC)  Unstable with an A1c 6%  - Exercise for least 150 minutes/week-pt will try to join the gym  - Stable hydration  - Daily feet check  - Maintain a healthy diet, minimal carbohydrate, portion control   --Pt has insulin resistance-see HPI     Metformin:  Metformin- stop  Added Mounjaro 2.5 mg weekly       - Tirzepatide (MOUNJARO) 2.5 MG/0.5ML Solution Pen-injector; Inject 2.5 mg under the skin every 7 days.  Dispense: 6 mL; Refill: 11    5. Other fatigue  Unstable  Neg for adrenal insufficieny   We will treat for diagnosis #4     6. Overweight (BMI 25.0-29.9)  Unstable  - Exercise for least 150 minutes/week-pt will try to join the gym  - Stable hydration  - Daily  feet check    Disposition: Follow-up in 3 months   Do blood work 2 weeks before appt      Thank you kindly for allowing me to participate in the thyroid care plan for this patient.        Ruddy Beckwith, A.P.R.N.  01/23/24            CC:   Claudia Lynne M.D.

## 2024-01-24 ENCOUNTER — TELEPHONE (OUTPATIENT)
Dept: ENDOCRINOLOGY | Facility: MEDICAL CENTER | Age: 64
End: 2024-01-24
Payer: COMMERCIAL

## 2024-01-24 NOTE — TELEPHONE ENCOUNTER
Prior Authorization for Mounjaro 2.5MG/0.5ML pen-injectors (Quantity: 2mls, Days: 28) has been submitted via Cover My Meds: Key ([id number])    Insurance: [employer]     Will follow up in 24-48 business hours.

## 2024-01-25 ENCOUNTER — TELEPHONE (OUTPATIENT)
Dept: PHARMACY | Facility: MEDICAL CENTER | Age: 64
End: 2024-01-25
Payer: COMMERCIAL

## 2024-01-25 PROCEDURE — RXMED WILLOW AMBULATORY MEDICATION CHARGE

## 2024-01-25 NOTE — TELEPHONE ENCOUNTER
Prior Authorization for Mounjaro 2.5MG/0.5ML pen-injectors has been approved for a quantity of 2mls , day supply 28D    Insurance-[employer]    Prior Authorization Reference#[id number]    Dates in effect, from 01/24/24 through 01/23/25    Co-pay: $25/2mls/28D  Max of 34 days at [facility]     Pharmacy and phone number   [facility]/pharmacy #[id number] [place] [address]  Phone: [phone]  Fax: [phone]     Is patient eligible to fill with [facility]? Yes    Next Steps: Routing approval to liaisons and Not Sending to outreach due to stock limitations at [facility] from [facility]

## 2024-01-26 ENCOUNTER — PHARMACY VISIT (OUTPATIENT)
Dept: PHARMACY | Facility: MEDICAL CENTER | Age: 64
End: 2024-01-26
Payer: COMMERCIAL

## 2024-01-26 NOTE — TELEPHONE ENCOUNTER
Contact:  Phone number:[phone] (mobile)    Name of person spoken with and relationship to patient: Jessica Bishop   Patient’s Adherence:  How patient is doing on medication: Very Well    How many missed doses and reason: 0 N/A    Any new medications: No    Any new conditions: No    Any new allergies: No    Any new side effects: No    Any new diagnoses: No    How many doses remainin    Did patient want to speak with pharmacist: No   Delivery:  Delivery date and method: 24 via     Needs by Date: 24    Signature required: No     Any additional details for : N/A   Teach Appointment Date:  N/A   Shipping Address:  [address] [place] [address]   Medication(name,strength and dose):  Mounjaro 2.5MG/0.5ML Spon   Copay:  $25.00   Payment Method:  [employer]   Supplies:  [employer], Alcohol Swabs   Additional Information:  NONE     Fátima Figueroa, Pharmacy Liaison/ RX Coordinator

## 2024-02-23 ENCOUNTER — PHARMACY VISIT (OUTPATIENT)
Dept: PHARMACY | Facility: MEDICAL CENTER | Age: 64
End: 2024-02-23
Payer: COMMERCIAL

## 2024-02-25 PROCEDURE — RXMED WILLOW AMBULATORY MEDICATION CHARGE

## 2024-03-18 DIAGNOSIS — E11.9 TYPE 2 DIABETES MELLITUS WITHOUT COMPLICATION, WITHOUT LONG-TERM CURRENT USE OF INSULIN (HCC): ICD-10-CM

## 2024-03-18 RX ORDER — TIRZEPATIDE 2.5 MG/.5ML
2.5 INJECTION, SOLUTION SUBCUTANEOUS
Qty: 6 ML | Refills: 11 | Status: SHIPPED | OUTPATIENT
Start: 2024-03-18 | End: 2024-03-21 | Stop reason: SDUPTHER

## 2024-03-21 ENCOUNTER — TELEPHONE (OUTPATIENT)
Dept: ENDOCRINOLOGY | Facility: MEDICAL CENTER | Age: 64
End: 2024-03-21
Payer: COMMERCIAL

## 2024-03-21 DIAGNOSIS — E11.9 TYPE 2 DIABETES MELLITUS WITHOUT COMPLICATION, WITHOUT LONG-TERM CURRENT USE OF INSULIN (HCC): ICD-10-CM

## 2024-03-21 RX ORDER — TIRZEPATIDE 2.5 MG/.5ML
2.5 INJECTION, SOLUTION SUBCUTANEOUS
Qty: 6 ML | Refills: 11 | Status: SHIPPED | OUTPATIENT
Start: 2024-03-21 | End: 2024-03-26 | Stop reason: DRUGHIGH

## 2024-03-21 NOTE — TELEPHONE ENCOUNTER
Received rejection on refill test claim for Mounjaro 2.5mg.  [place] called [facility] and spoke with Valentín who confirmed that patient must fill through [facility] or [facility].  I called patient at [phone] and discussed refill rejection and options for further refills.  Patient agreed to have prescription sent to [facility] and remove her from our refill services.    Thank you,  January Ward [facility]  Endocrinology Coordinator   [phone]

## 2024-03-22 ENCOUNTER — TELEPHONE (OUTPATIENT)
Dept: ENDOCRINOLOGY | Facility: MEDICAL CENTER | Age: 64
End: 2024-03-22
Payer: COMMERCIAL

## 2024-03-26 DIAGNOSIS — E11.9 TYPE 2 DIABETES MELLITUS WITHOUT COMPLICATION, WITHOUT LONG-TERM CURRENT USE OF INSULIN (HCC): ICD-10-CM

## 2024-03-26 RX ORDER — TIRZEPATIDE 5 MG/.5ML
0.5 INJECTION, SOLUTION SUBCUTANEOUS
Qty: 6 ML | Refills: 0 | Status: SHIPPED | OUTPATIENT
Start: 2024-03-26

## 2024-04-19 ENCOUNTER — HOSPITAL ENCOUNTER (OUTPATIENT)
Dept: LAB | Facility: MEDICAL CENTER | Age: 64
End: 2024-04-19
Payer: COMMERCIAL

## 2024-04-19 ENCOUNTER — HOSPITAL ENCOUNTER (OUTPATIENT)
Dept: LAB | Facility: MEDICAL CENTER | Age: 64
End: 2024-04-19
Attending: FAMILY MEDICINE
Payer: COMMERCIAL

## 2024-04-19 DIAGNOSIS — E55.9 VITAMIN D DEFICIENCY: ICD-10-CM

## 2024-04-19 DIAGNOSIS — E04.1 THYROID NODULE: ICD-10-CM

## 2024-04-19 DIAGNOSIS — E78.5 HYPERLIPIDEMIA, UNSPECIFIED HYPERLIPIDEMIA TYPE: ICD-10-CM

## 2024-04-19 DIAGNOSIS — E06.3 HASHIMOTO'S DISEASE: ICD-10-CM

## 2024-04-19 LAB
25(OH)D3 SERPL-MCNC: 58 NG/ML (ref 30–100)
ALBUMIN SERPL BCP-MCNC: 4.7 G/DL (ref 3.2–4.9)
ALBUMIN/GLOB SERPL: 1.7 G/DL
ALP SERPL-CCNC: 70 U/L (ref 30–99)
ALT SERPL-CCNC: 22 U/L (ref 2–50)
ANION GAP SERPL CALC-SCNC: 12 MMOL/L (ref 7–16)
AST SERPL-CCNC: 17 U/L (ref 12–45)
BILIRUB SERPL-MCNC: 0.5 MG/DL (ref 0.1–1.5)
BUN SERPL-MCNC: 11 MG/DL (ref 8–22)
CALCIUM ALBUM COR SERPL-MCNC: 9.3 MG/DL (ref 8.5–10.5)
CALCIUM SERPL-MCNC: 9.9 MG/DL (ref 8.5–10.5)
CHLORIDE SERPL-SCNC: 106 MMOL/L (ref 96–112)
CHOLEST SERPL-MCNC: 116 MG/DL (ref 100–199)
CO2 SERPL-SCNC: 25 MMOL/L (ref 20–33)
CREAT SERPL-MCNC: 0.82 MG/DL (ref 0.5–1.4)
FASTING STATUS PATIENT QL REPORTED: NORMAL
GFR SERPLBLD CREATININE-BSD FMLA CKD-EPI: 80 ML/MIN/1.73 M 2
GLOBULIN SER CALC-MCNC: 2.7 G/DL (ref 1.9–3.5)
GLUCOSE SERPL-MCNC: 96 MG/DL (ref 65–99)
HDLC SERPL-MCNC: 35 MG/DL
LDLC SERPL CALC-MCNC: 55 MG/DL
POTASSIUM SERPL-SCNC: 4 MMOL/L (ref 3.6–5.5)
PROT SERPL-MCNC: 7.4 G/DL (ref 6–8.2)
SODIUM SERPL-SCNC: 143 MMOL/L (ref 135–145)
T4 FREE SERPL-MCNC: 1.37 NG/DL (ref 0.93–1.7)
TRIGL SERPL-MCNC: 132 MG/DL (ref 0–149)
TSH SERPL DL<=0.005 MIU/L-ACNC: 0.93 UIU/ML (ref 0.38–5.33)

## 2024-04-19 PROCEDURE — 84443 ASSAY THYROID STIM HORMONE: CPT

## 2024-04-19 PROCEDURE — 84439 ASSAY OF FREE THYROXINE: CPT

## 2024-04-19 PROCEDURE — 80053 COMPREHEN METABOLIC PANEL: CPT

## 2024-04-19 PROCEDURE — 82306 VITAMIN D 25 HYDROXY: CPT

## 2024-04-19 PROCEDURE — 36415 COLL VENOUS BLD VENIPUNCTURE: CPT

## 2024-04-19 PROCEDURE — 80061 LIPID PANEL: CPT

## 2024-04-29 RX ORDER — ATORVASTATIN CALCIUM 20 MG/1
20 TABLET, FILM COATED ORAL DAILY
Qty: 90 TABLET | Refills: 1 | Status: SHIPPED | OUTPATIENT
Start: 2024-04-29

## 2024-05-03 ENCOUNTER — APPOINTMENT (OUTPATIENT)
Dept: MEDICAL GROUP | Facility: PHYSICIAN GROUP | Age: 64
End: 2024-05-03
Payer: COMMERCIAL

## 2024-05-06 ENCOUNTER — APPOINTMENT (OUTPATIENT)
Dept: MEDICAL GROUP | Facility: PHYSICIAN GROUP | Age: 64
End: 2024-05-06
Payer: COMMERCIAL

## 2024-05-06 VITALS
TEMPERATURE: 97.2 F | HEIGHT: 68 IN | OXYGEN SATURATION: 98 % | HEART RATE: 86 BPM | DIASTOLIC BLOOD PRESSURE: 74 MMHG | RESPIRATION RATE: 16 BRPM | WEIGHT: 173.3 LBS | SYSTOLIC BLOOD PRESSURE: 126 MMHG | BODY MASS INDEX: 26.27 KG/M2

## 2024-05-06 DIAGNOSIS — E11.9 TYPE 2 DIABETES MELLITUS WITHOUT COMPLICATION, WITHOUT LONG-TERM CURRENT USE OF INSULIN (HCC): Chronic | ICD-10-CM

## 2024-05-06 DIAGNOSIS — E78.5 HYPERLIPIDEMIA, UNSPECIFIED HYPERLIPIDEMIA TYPE: ICD-10-CM

## 2024-05-06 PROCEDURE — 99214 OFFICE O/P EST MOD 30 MIN: CPT | Performed by: FAMILY MEDICINE

## 2024-05-06 PROCEDURE — 3074F SYST BP LT 130 MM HG: CPT | Performed by: FAMILY MEDICINE

## 2024-05-06 PROCEDURE — 3078F DIAST BP <80 MM HG: CPT | Performed by: FAMILY MEDICINE

## 2024-05-06 ASSESSMENT — PATIENT HEALTH QUESTIONNAIRE - PHQ9: CLINICAL INTERPRETATION OF PHQ2 SCORE: 0

## 2024-05-06 ASSESSMENT — FIBROSIS 4 INDEX: FIB4 SCORE: 1.01

## 2024-05-06 NOTE — PROGRESS NOTES
"Subjective:     CC:   Chief Complaint   Patient presents with    Follow-Up       HPI:   Jessica presents today for follow-up.  Patient has been on Mounjaro for diabetes and to help her lose weight..  Patient's been told by endocrinology that she can only be on it for about a year.  Her year will end sometime in December.    Past Medical History:   Diagnosis Date    Hyperlipidemia 2019       Social History     Tobacco Use    Smoking status: Former     Current packs/day: 0.00     Average packs/day: 1 pack/day for 20.0 years (20.0 ttl pk-yrs)     Types: Cigarettes     Start date:      Quit date:      Years since quittin.3    Smokeless tobacco: Never   Vaping Use    Vaping Use: Former   Substance Use Topics    Alcohol use: Yes     Comment: Occ    Drug use: Yes     Types: Marijuana     Comment: Gummy       Current Outpatient Medications Ordered in Epic   Medication Sig Dispense Refill    atorvastatin (LIPITOR) 20 MG Tab TAKE 1 TABLET BY MOUTH EVERY DAY 90 Tablet 1    Tirzepatide (MOUNJARO) 5 MG/0.5ML Solution Pen-injector Inject 0.5 mL under the skin every 7 days. 6 mL 0     No current Epic-ordered facility-administered medications on file.       Allergies:  Patient has no known allergies.    Health Maintenance: Completed    ROS:  Gen: no fevers/chills, patient has lost about 15 pounds since of last saner  Eyes: no changes in vision  ENT: no sore throat, no hearing loss, no bloody nose  Pulm: no sob, no cough  CV: no chest pain, no palpitations  GI: no nausea/vomiting, no diarrhea  : no dysuria  Neuro: no headaches, no numbness/tingling  Heme/Lymph: no easy bruising    Objective:     Exam:  /74 (BP Location: Left arm, Patient Position: Sitting, BP Cuff Size: Adult)   Pulse 86   Temp 36.2 °C (97.2 °F) (Temporal)   Resp 16   Ht 1.727 m (5' 8\")   Wt 78.6 kg (173 lb 4.8 oz)   SpO2 98%   BMI 26.35 kg/m²  Body mass index is 26.35 kg/m².    Gen: Alert and oriented, No apparent " distress.  Skin: Warm and dry.  No obvious lesions.  Eyes: Sclera wnl Pupils normal in size  Lungs: Normal effort, CTA bilaterally, no wheezes, rhonchi, or rales  CV: Regular rate and rhythm. No murmurs, rubs, or gallops.  Musculoskeletal: Normal gait. No extremity cyanosis, clubbing, or edema.  Neuro: Oriented to person, place and time  Psych: Mood is wnl     Assessment & Plan:     64 y.o. female with the following -     1. Hyperlipidemia, unspecified hyperlipidemia type  Recommend checking her lipid panel again hopefully her HDL increases recommend regular exercise.  Will recheck this again in September or October timeframe    2. Type 2 diabetes mellitus without complication, without long-term current use of insulin (HCC)  Will check a hemoglobin A1c in September or October timeframe       Return in about 4 months (around 9/6/2024), or if symptoms worsen or fail to improve.    Please note that this dictation was created using voice recognition software. I have made every reasonable attempt to correct obvious errors, but I expect that there are errors of grammar and possibly content that I did not discover before finalizing the note.

## 2024-06-03 ENCOUNTER — TELEPHONE (OUTPATIENT)
Dept: HEALTH INFORMATION MANAGEMENT | Facility: OTHER | Age: 64
End: 2024-06-03

## 2024-06-03 ENCOUNTER — OFFICE VISIT (OUTPATIENT)
Dept: ENDOCRINOLOGY | Facility: MEDICAL CENTER | Age: 64
End: 2024-06-03
Payer: COMMERCIAL

## 2024-06-03 VITALS
HEIGHT: 68 IN | HEART RATE: 99 BPM | OXYGEN SATURATION: 96 % | BODY MASS INDEX: 25.61 KG/M2 | DIASTOLIC BLOOD PRESSURE: 62 MMHG | WEIGHT: 169 LBS | SYSTOLIC BLOOD PRESSURE: 112 MMHG

## 2024-06-03 DIAGNOSIS — E04.1 THYROID NODULE: ICD-10-CM

## 2024-06-03 DIAGNOSIS — E11.9 TYPE 2 DIABETES MELLITUS WITHOUT COMPLICATION, WITHOUT LONG-TERM CURRENT USE OF INSULIN (HCC): ICD-10-CM

## 2024-06-03 DIAGNOSIS — E06.3 HASHIMOTO'S DISEASE: ICD-10-CM

## 2024-06-03 DIAGNOSIS — E55.9 VITAMIN D DEFICIENCY: ICD-10-CM

## 2024-06-03 DIAGNOSIS — E66.3 OVERWEIGHT (BMI 25.0-29.9): ICD-10-CM

## 2024-06-03 DIAGNOSIS — K63.5 POLYP OF COLON, UNSPECIFIED PART OF COLON, UNSPECIFIED TYPE: ICD-10-CM

## 2024-06-03 DIAGNOSIS — E78.5 DYSLIPIDEMIA: ICD-10-CM

## 2024-06-03 LAB
HBA1C MFR BLD: 5.3 % (ref ?–5.8)
POCT INT CON NEG: NEGATIVE
POCT INT CON POS: POSITIVE
RETINAL SCREEN: NORMAL

## 2024-06-03 PROCEDURE — 3078F DIAST BP <80 MM HG: CPT

## 2024-06-03 PROCEDURE — 99214 OFFICE O/P EST MOD 30 MIN: CPT

## 2024-06-03 PROCEDURE — 3074F SYST BP LT 130 MM HG: CPT

## 2024-06-03 PROCEDURE — 99213 OFFICE O/P EST LOW 20 MIN: CPT

## 2024-06-03 PROCEDURE — 83036 HEMOGLOBIN GLYCOSYLATED A1C: CPT

## 2024-06-03 PROCEDURE — 99211 OFF/OP EST MAY X REQ PHY/QHP: CPT

## 2024-06-03 RX ORDER — TIRZEPATIDE 5 MG/.5ML
5 INJECTION, SOLUTION SUBCUTANEOUS
Qty: 6 ML | Refills: 3 | Status: SHIPPED | OUTPATIENT
Start: 2024-06-03

## 2024-06-03 ASSESSMENT — FIBROSIS 4 INDEX: FIB4 SCORE: 1.01

## 2024-06-03 NOTE — PROGRESS NOTES
Chief Complaint: Follow-up of Thyroid Nodule    HPI:   Jessica Bishop is a 63 y.o. female    1. Thyroid Nodule:   History of Thyroid Nodule diagnosed December 2021and is here for follow-up  Patient failed to follow-up due to life constrains, and was not able to come for a follow-up appointment until now    Thyroid US from December 3, 2021 was reviewed and discussed with the patient in detail.    This showed a 1.03 cm thyroid nodule on the right lateral thyroid lobe, solid, isoechoic, taller than wide, TR 5  Thyroid gland was heterogeneous with normal vascularity    FNA biopsy was ordered and done on 3/30/2022 unsuccessfully-3/30/2022 patient was told that thyroid nodule was too small and could not be found  On last appointment patient was agreeable to repeat thyroid ultrasound    Thyroid ultrasound done on 7/5/2022 showed:  Nodule #1 located on the right lower thyroid lobe measuring 1.11 cm previously measuring 1.03 cm solid isoechoic, taller than wide, with peripheral echogenicity TR 5    FNA biopsy was ordered on last appointment but the radiology department needed a more updated ultrasound  Ultrasound was done on 9/6/2023 showing no changes to her thyroid nodules  FNA biopsy schedule                        Denies hoarseness, dysphagia, dyspnea, anterior neck pain and anterior neck swelling.       She reports a family history of goiter.    She denies a history of radiation exposure to head or neck.    She reports fatigue related she feels this is relating to eating less carb to reduce her weight, weight gain related to food, sweating       She denies history of taking thyroid medications.        Latest Reference Range & Units 04/19/24 07:07   TSH 0.380 - 5.330 uIU/mL 0.930   Free T-4 0.93 - 1.70 ng/dL 1.37     3.  Hashimoto's disease:  Elevated antibody levels   Ref. Range 4/2/2022 11:10   Microsomal -Tpo- Abs Latest Ref Range: 0.0 - 9.0 IU/mL 105.0 (H)   Anti-Thyroglobulin Latest Ref Range: 0.0 - 4.0 IU/mL  "<0.9       3.  Vitamin D deficiency:  Currently taking 5000IUs daily vit D3    Latest Reference Range & Units 04/19/24 07:07   25-Hydroxy   Vitamin D 25 30 - 100 ng/mL 58     4.  Type 2 Diabetes:  Diet: Healthy in general, salads, minimal fats, protein high, low carbs   Exercise: going to gym, pickle ball, gardening    Last eye exam: a year and a half ago.     POC A1c on 6/3/24 is 5.3%  Currently taking: Mounjaro 5 mg weekly   Latest Reference Range & Units 01/08/24 08:27 01/08/24 08:28   C-Peptide 0.5 - 3.3 ng/mL 2.6    IA-2, Autoantibody 0.0 - 7.4 U/mL  <5.4      Latest Reference Range & Units 01/08/24 08:27   PINKY Antibody 0.0 - 5.0 IU/mL <5.0     High insulin resistance    Latest Reference Range & Units 01/08/24 08:27   Insulin Fasting 3 - 25 uIU/mL 13      Latest Reference Range & Units 07/14/23 07:36   Glucose 65 - 99 mg/dL 114 (H)          Latest Reference Range & Units 07/14/23 07:36   Glycohemoglobin 4.0 - 5.6 % 6.0 (H)   Estim. Avg Glu mg/dL 126   Fasting Status  Fasting      Latest Reference Range & Units 03/13/23 09:12   Micro Alb Creat Ratio 0 - 30 mg/g 9   Creatinine, Urine mg/dL 169.00   Microalbumin, Urine Random mg/dL 1.6      Latest Reference Range & Units 04/19/24 07:07   GFR (CKD-EPI) >60 mL/min/1.73 m 2 80       5. Overweight:   Exercise: Gym 2-3x/week and plays picke ball   Diet: healthy in general   Weight: 169 lbs in clinic today        11/14/2023  0809 11/30/2023  0821 1/23/2024  1326 Most Recent Value    Weight: 86.5 kg (190 lb 11.2 oz) 85.3 kg (188 lb) 85.5 kg (188 lb 9.6 oz) 85.5 kg (188 lb 9.6 oz)  as of 1/23/2024   Body Mass Index:    28.68 kg/m² Abnormal   1.727 m (5' 8\")  as of 1/23/2024  85.5 kg (188 lb 9.6 oz)  as of 1/23/2024       11/10/2023   543943/14/2023   [id number]/30/2023   0821Most Recent Value Weight:85.4 kg (188 lb 3.2 oz)86.5 kg (190 lb 11.2 oz)85.3 kg (188 lb)85.3 kg (188 lb)  as of 11/30/2023 Body Mass Index:28.59 kg/m² Abnormal    1.727 m (5' 8\")  as of 11/30/2023   85.3 " kg (188 lb)  as of 11/30/2023     6. Dyslipidemia  Currently taking rosuvastatin 10 mg every evening   Latest Reference Range & Units 04/19/24 07:07   Cholesterol,Tot 100 - 199 mg/dL 116   Triglycerides 0 - 149 mg/dL 132   HDL >=40 mg/dL 35 !   LDL <100 mg/dL 55     7. Vitamin D deficiency  Currently taking 2000 IU daily of vitamin D 3    Latest Reference Range & Units 04/19/24 07:07   25-Hydroxy   Vitamin D 25 30 - 100 ng/mL 58     Patient's medications, allergies, and social histories were reviewed and updated as appropriate.  ROS:      CONS:     No fever, no chills, no weight loss, no fatigue   EYES:      No diplopia, no blurry vision, no redness of eyes, no swelling of eyelids   ENT:    No hearing loss, No ear pain, No sore throat, no dysphagia, no neck swelling   CV:     No chest pain, no palpitations, no claudication, no orthopnea, no PND   PULM:    No SOB, no cough, no hemoptysis, no wheezing    GI:   No nausea, no vomiting, no diarrhea, no constipation, no bloody stools   :  Passing urine well, no dysuria, no hematuria   ENDO:   No polyuria, no polydipsia, no heat intolerance, no cold intolerance   NEURO: No headaches, no dizziness, no convulsions, no tremors   MUSC:  No joint swellings, no arthralgias, no myalgias, no weakness   SKIN:   No rash, no ulcers, no dry skin   PSYCH:   No depression, no anxiety, no difficulty sleeping       Past Medical History:  Patient Active Problem List    Diagnosis Date Noted    Hashimoto's disease 06/03/2024    Vitamin D deficiency 06/03/2024    Other fatigue 10/25/2023    Thyroid nodule 04/26/2023    Low vitamin D level 12/27/2021    Hypercalcemia 12/04/2020    Type 2 diabetes mellitus without complication, without long-term current use of insulin (HCC) 11/05/2020    Erythrocytosis 11/05/2020    Dyslipidemia 04/11/2019       Past Surgical History:  Past Surgical History:   Procedure Laterality Date    HYSTERECTOMY, TOTAL ABDOMINAL      ovaries remain; for endometriosis      TONSILLECTOMY          Allergies:  Patient has no known allergies.     Current Medications:    Current Outpatient Medications:     Tirzepatide (MOUNJARO) 5 MG/0.5ML Solution Pen-injector, Inject 5 mg under the skin every 7 days., Disp: 6 mL, Rfl: 3    atorvastatin (LIPITOR) 20 MG Tab, TAKE 1 TABLET BY MOUTH EVERY DAY, Disp: 90 Tablet, Rfl: 1    Social History:  Social History     Socioeconomic History    Marital status: Single     Spouse name: Not on file    Number of children: Not on file    Years of education: Not on file    Highest education level: Bachelor's degree (e.g., BA, AB, BS)   Occupational History    Not on file   Tobacco Use    Smoking status: Former     Current packs/day: 0.00     Average packs/day: 1 pack/day for 20.0 years (20.0 ttl pk-yrs)     Types: Cigarettes     Start date:      Quit date:      Years since quittin.4    Smokeless tobacco: Never   Vaping Use    Vaping status: Former   Substance and Sexual Activity    Alcohol use: Yes     Comment: Occ    Drug use: Yes     Types: Marijuana     Comment: Gummy    Sexual activity: Not on file   Other Topics Concern    Not on file   Social History Narrative    /      Social Determinants of Health     Financial Resource Strain: Medium Risk (2023)    Overall Financial Resource Strain (CARDIA)     Difficulty of Paying Living Expenses: Somewhat hard   Food Insecurity: No Food Insecurity (2023)    Hunger Vital Sign     Worried About Running Out of Food in the Last Year: Never true     Ran Out of Food in the Last Year: Never true   Transportation Needs: No Transportation Needs (2023)    PRAPARE - Transportation     Lack of Transportation (Medical): No     Lack of Transportation (Non-Medical): No   Physical Activity: Insufficiently Active (2023)    Exercise Vital Sign     Days of Exercise per Week: 3 days     Minutes of Exercise per Session: 30 min   Stress: Stress Concern Present  "(4/26/2023)    [place] [employer] - Occupational Stress Questionnaire     Feeling of Stress : To some extent   Social Connections: Socially Isolated (4/26/2023)    Social Connection and Isolation Panel [NHANES]     Frequency of Communication with Friends and Family: Once a week     Frequency of Social Gatherings with Friends and Family: Never     Attends Cheondoism Services: Never     Active Member of Clubs or Organizations: No     Attends Club or Organization Meetings: Never     Marital Status: Living with partner   Intimate Partner Violence: Not on file   Housing Stability: Low Risk  (4/26/2023)    Housing Stability Vital Sign     Unable to Pay for Housing in the Last Year: No     Number of Places Lived in the Last Year: 1     Unstable Housing in the Last Year: No        Family History:   Family History   Problem Relation Age of Onset    Stroke Mother     Heart Disease Mother     Lung Disease Father         COPD, smoker     Heart Disease Maternal Grandmother     Cancer Paternal Grandmother     Cancer Paternal Grandfather          PHYSICAL EXAM:   Vital signs: /62 (BP Location: Left arm, Patient Position: Sitting, BP Cuff Size: Adult)   Pulse 99   Ht 1.727 m (5' 8\")   Wt 76.7 kg (169 lb)   SpO2 96%   BMI 25.70 kg/m²   GENERAL: Well-developed, well-nourished  in no apparent distress.    SKIN: No rashes, lesions. Turgor is normal.    Labs:  Lab Results   Component Value Date/Time    WBC 7.1 03/13/2023 09:11 AM    RBC 5.38 03/13/2023 09:11 AM    HEMOGLOBIN 16.4 (H) 03/13/2023 09:11 AM    MCV 91.3 03/13/2023 09:11 AM    MCH 30.5 03/13/2023 09:11 AM    MCHC 33.4 (L) 03/13/2023 09:11 AM    RDW 44.2 03/13/2023 09:11 AM    MPV 10.7 03/13/2023 09:11 AM       Lab Results   Component Value Date/Time    SODIUM 143 04/19/2024 07:07 AM    POTASSIUM 4.0 04/19/2024 07:07 AM    CHLORIDE 106 04/19/2024 07:07 AM    CO2 25 04/19/2024 07:07 AM    ANION 12.0 04/19/2024 07:07 AM    GLUCOSE 96 04/19/2024 " 07:07 AM    BUN 11 04/19/2024 07:07 AM    CREATININE 0.82 04/19/2024 07:07 AM    CALCIUM 9.9 04/19/2024 07:07 AM    ASTSGOT 17 04/19/2024 07:07 AM    ALTSGPT 22 04/19/2024 07:07 AM    TBILIRUBIN 0.5 04/19/2024 07:07 AM    ALBUMIN 4.7 04/19/2024 07:07 AM    TOTPROTEIN 7.4 04/19/2024 07:07 AM    GLOBULIN 2.7 04/19/2024 07:07 AM    AGRATIO 1.7 04/19/2024 07:07 AM       Lab Results   Component Value Date/Time    TSHULTRASEN 1.960 12/15/2021 0638     Lab Results   Component Value Date/Time    FREET4 0.82 10/28/2014 0630     Imaging:    Thyroid ultrasound done on 7/5/2022 showed:  Nodule #1 located on the right lower thyroid lobe measuring 1.11 cm previously measuring 1.03 cm solid isoechoic, taller than wide, with peripheral echogenicity TR 5    ASSESSMENT/PLAN:   1. Thyroid nodule  Stable with benign nodule  US will be done on 9/2024  - TSH; Future  - FREE THYROXINE; Future    2. Hashimoto's disease  This is the etiology of Diagnosis #1    3. Vitamin D deficiency  - Stable  -Continue regimen-see HPI  - VITAMIN D,25 HYDROXY (DEFICIENCY); Future    4. Dyslipidemia  Stable  Continue current regimen- see HPI  This is followed by PCP  - Lipid Profile; Future    5. Type 2 diabetes mellitus without complication, without long-term current use of insulin (HCC)  Stable  POC A1c 5.3 in clinic today  Medication:  Mounjaro 5 mg weekly - continue  - continue Exercise for least 150 minutes/week  - Stable hydration  - Daily feet check  - Maintain a healthy diet, minimal carbohydrate, portion control   --Pt has insulin resistance-see HPI   - POCT Hemoglobin A1C  - Comp Metabolic Panel; Future  - MICROALBUMIN CREAT RATIO URINE; Future  - Tirzepatide (MOUNJARO) 5 MG/0.5ML Solution Pen-injector; Inject 5 mg under the skin every 7 days.  Dispense: 6 mL; Refill: 3     6. Overweight (BMI 25.0-29.9)  Stable  - Continue Exercise for least 150 minutes/week  - Stable hydration  - Daily feet check    Disposition: Return in about 6 months (around  12/3/2024).    Do blood work 2 weeks before appt      Thank you kindly for allowing me to participate in the thyroid care plan for this patient.    Reggie Hardy A.P.R.N.  6/3/24            CC:   Claudia Lynne M.D.

## 2024-06-03 NOTE — TELEPHONE ENCOUNTER
1. Caller Name: Jessica Bishop   Call Back Number: [phone] (home)        How would the patient prefer to be contacted with a response: [facility]    2. SPECIFIC Action To Be Taken: Referral pending, please sign.    3. Diagnosis/Clinical Reason for Request: Patient sent message asking to get referred to GI for colorectal cancer screening. Please advise.     4. Specialty & Provider Name/Lab/Imaging Location: [facility]/ Dr. Knott    5. Is appointment scheduled for requested order/referral: no    Patient was informed they will receive a return phone call from the office ONLY if there are any questions before processing their request. Advised to call back if they haven't received a call from the referral department in 5 days.

## 2024-08-05 ENCOUNTER — PATIENT MESSAGE (OUTPATIENT)
Dept: HEALTH INFORMATION MANAGEMENT | Facility: OTHER | Age: 64
End: 2024-08-05

## 2024-09-28 ENCOUNTER — HOSPITAL ENCOUNTER (OUTPATIENT)
Dept: RADIOLOGY | Facility: MEDICAL CENTER | Age: 64
End: 2024-09-28
Payer: COMMERCIAL

## 2024-09-28 DIAGNOSIS — E04.1 THYROID NODULE: ICD-10-CM

## 2024-09-28 DIAGNOSIS — E06.3 HASHIMOTO'S DISEASE: ICD-10-CM

## 2024-09-28 PROCEDURE — 76536 US EXAM OF HEAD AND NECK: CPT

## 2024-10-05 ENCOUNTER — HOSPITAL ENCOUNTER (OUTPATIENT)
Dept: LAB | Facility: MEDICAL CENTER | Age: 64
End: 2024-10-05
Attending: FAMILY MEDICINE
Payer: COMMERCIAL

## 2024-10-05 DIAGNOSIS — E78.5 HYPERLIPIDEMIA, UNSPECIFIED HYPERLIPIDEMIA TYPE: ICD-10-CM

## 2024-10-05 DIAGNOSIS — D75.1 ERYTHROCYTOSIS: Chronic | ICD-10-CM

## 2024-10-05 DIAGNOSIS — E11.9 TYPE 2 DIABETES MELLITUS WITHOUT COMPLICATION, WITHOUT LONG-TERM CURRENT USE OF INSULIN (HCC): Chronic | ICD-10-CM

## 2024-10-05 LAB
ALBUMIN SERPL BCP-MCNC: 4.5 G/DL (ref 3.2–4.9)
ALBUMIN/GLOB SERPL: 1.5 G/DL
ALP SERPL-CCNC: 78 U/L (ref 30–99)
ALT SERPL-CCNC: 24 U/L (ref 2–50)
ANION GAP SERPL CALC-SCNC: 11 MMOL/L (ref 7–16)
AST SERPL-CCNC: 18 U/L (ref 12–45)
BASOPHILS # BLD AUTO: 0.6 % (ref 0–1.8)
BASOPHILS # BLD: 0.04 K/UL (ref 0–0.12)
BILIRUB SERPL-MCNC: 0.4 MG/DL (ref 0.1–1.5)
BUN SERPL-MCNC: 14 MG/DL (ref 8–22)
CALCIUM ALBUM COR SERPL-MCNC: 10.1 MG/DL (ref 8.5–10.5)
CALCIUM SERPL-MCNC: 10.5 MG/DL (ref 8.5–10.5)
CHLORIDE SERPL-SCNC: 107 MMOL/L (ref 96–112)
CHOLEST SERPL-MCNC: 130 MG/DL (ref 100–199)
CO2 SERPL-SCNC: 24 MMOL/L (ref 20–33)
CREAT SERPL-MCNC: 0.93 MG/DL (ref 0.5–1.4)
EOSINOPHIL # BLD AUTO: 0.1 K/UL (ref 0–0.51)
EOSINOPHIL NFR BLD: 1.5 % (ref 0–6.9)
ERYTHROCYTE [DISTWIDTH] IN BLOOD BY AUTOMATED COUNT: 45.6 FL (ref 35.9–50)
EST. AVERAGE GLUCOSE BLD GHB EST-MCNC: 108 MG/DL
GFR SERPLBLD CREATININE-BSD FMLA CKD-EPI: 68 ML/MIN/1.73 M 2
GLOBULIN SER CALC-MCNC: 3 G/DL (ref 1.9–3.5)
GLUCOSE SERPL-MCNC: 94 MG/DL (ref 65–99)
HBA1C MFR BLD: 5.4 % (ref 4–5.6)
HCT VFR BLD AUTO: 47.8 % (ref 37–47)
HDLC SERPL-MCNC: 49 MG/DL
HGB BLD-MCNC: 16.2 G/DL (ref 12–16)
IMM GRANULOCYTES # BLD AUTO: 0.03 K/UL (ref 0–0.11)
IMM GRANULOCYTES NFR BLD AUTO: 0.4 % (ref 0–0.9)
LDLC SERPL CALC-MCNC: 60 MG/DL
LYMPHOCYTES # BLD AUTO: 0.71 K/UL (ref 1–4.8)
LYMPHOCYTES NFR BLD: 10.4 % (ref 22–41)
MCH RBC QN AUTO: 31.4 PG (ref 27–33)
MCHC RBC AUTO-ENTMCNC: 33.9 G/DL (ref 32.2–35.5)
MCV RBC AUTO: 92.6 FL (ref 81.4–97.8)
MONOCYTES # BLD AUTO: 0.85 K/UL (ref 0–0.85)
MONOCYTES NFR BLD AUTO: 12.5 % (ref 0–13.4)
NEUTROPHILS # BLD AUTO: 5.09 K/UL (ref 1.82–7.42)
NEUTROPHILS NFR BLD: 74.6 % (ref 44–72)
NRBC # BLD AUTO: 0 K/UL
NRBC BLD-RTO: 0 /100 WBC (ref 0–0.2)
PLATELET # BLD AUTO: 205 K/UL (ref 164–446)
PMV BLD AUTO: 11 FL (ref 9–12.9)
POTASSIUM SERPL-SCNC: 4.2 MMOL/L (ref 3.6–5.5)
PROT SERPL-MCNC: 7.5 G/DL (ref 6–8.2)
RBC # BLD AUTO: 5.16 M/UL (ref 4.2–5.4)
SODIUM SERPL-SCNC: 142 MMOL/L (ref 135–145)
TRIGL SERPL-MCNC: 107 MG/DL (ref 0–149)
WBC # BLD AUTO: 6.8 K/UL (ref 4.8–10.8)

## 2024-10-05 PROCEDURE — 80061 LIPID PANEL: CPT

## 2024-10-05 PROCEDURE — 85025 COMPLETE CBC W/AUTO DIFF WBC: CPT

## 2024-10-05 PROCEDURE — 80053 COMPREHEN METABOLIC PANEL: CPT

## 2024-10-05 PROCEDURE — 36415 COLL VENOUS BLD VENIPUNCTURE: CPT

## 2024-10-05 PROCEDURE — 83036 HEMOGLOBIN GLYCOSYLATED A1C: CPT

## 2024-10-10 ENCOUNTER — OFFICE VISIT (OUTPATIENT)
Dept: MEDICAL GROUP | Facility: PHYSICIAN GROUP | Age: 64
End: 2024-10-10
Payer: COMMERCIAL

## 2024-10-10 VITALS
HEIGHT: 68 IN | RESPIRATION RATE: 16 BRPM | SYSTOLIC BLOOD PRESSURE: 126 MMHG | HEART RATE: 92 BPM | WEIGHT: 161.2 LBS | DIASTOLIC BLOOD PRESSURE: 74 MMHG | TEMPERATURE: 96.7 F | OXYGEN SATURATION: 99 % | BODY MASS INDEX: 24.43 KG/M2

## 2024-10-10 DIAGNOSIS — E78.5 DYSLIPIDEMIA: ICD-10-CM

## 2024-10-10 DIAGNOSIS — E11.9 TYPE 2 DIABETES MELLITUS WITHOUT COMPLICATION, WITHOUT LONG-TERM CURRENT USE OF INSULIN (HCC): ICD-10-CM

## 2024-10-10 DIAGNOSIS — E66.3 OVERWEIGHT (BMI 25.0-29.9): ICD-10-CM

## 2024-10-10 PROCEDURE — 3074F SYST BP LT 130 MM HG: CPT | Performed by: FAMILY MEDICINE

## 2024-10-10 PROCEDURE — 99214 OFFICE O/P EST MOD 30 MIN: CPT | Performed by: FAMILY MEDICINE

## 2024-10-10 PROCEDURE — 3078F DIAST BP <80 MM HG: CPT | Performed by: FAMILY MEDICINE

## 2024-10-10 RX ORDER — ATORVASTATIN CALCIUM 20 MG/1
20 TABLET, FILM COATED ORAL DAILY
Qty: 90 TABLET | Refills: 1 | Status: SHIPPED | OUTPATIENT
Start: 2024-10-10

## 2024-10-10 RX ORDER — POLYETHYLENE GLYCOL-3350 AND ELECTROLYTES 236; 6.74; 5.86; 2.97; 22.74 G/274.31G; G/274.31G; G/274.31G; G/274.31G; G/274.31G
POWDER, FOR SOLUTION ORAL
COMMUNITY
Start: 2024-09-06

## 2024-10-10 ASSESSMENT — FIBROSIS 4 INDEX: FIB4 SCORE: 1.15

## 2024-11-27 ENCOUNTER — HOSPITAL ENCOUNTER (OUTPATIENT)
Dept: LAB | Facility: MEDICAL CENTER | Age: 64
End: 2024-11-27
Payer: COMMERCIAL

## 2024-11-27 DIAGNOSIS — E55.9 VITAMIN D DEFICIENCY: ICD-10-CM

## 2024-11-27 DIAGNOSIS — E04.1 THYROID NODULE: ICD-10-CM

## 2024-11-27 DIAGNOSIS — E78.5 DYSLIPIDEMIA: ICD-10-CM

## 2024-11-27 DIAGNOSIS — E11.9 TYPE 2 DIABETES MELLITUS WITHOUT COMPLICATION, WITHOUT LONG-TERM CURRENT USE OF INSULIN (HCC): ICD-10-CM

## 2024-11-27 LAB
25(OH)D3 SERPL-MCNC: 48 NG/ML (ref 30–100)
ALBUMIN SERPL BCP-MCNC: 4.4 G/DL (ref 3.2–4.9)
ALBUMIN/GLOB SERPL: 1.6 G/DL
ALP SERPL-CCNC: 77 U/L (ref 30–99)
ALT SERPL-CCNC: 26 U/L (ref 2–50)
ANION GAP SERPL CALC-SCNC: 11 MMOL/L (ref 7–16)
AST SERPL-CCNC: 19 U/L (ref 12–45)
BILIRUB SERPL-MCNC: 0.3 MG/DL (ref 0.1–1.5)
BUN SERPL-MCNC: 19 MG/DL (ref 8–22)
CALCIUM ALBUM COR SERPL-MCNC: 9.4 MG/DL (ref 8.5–10.5)
CALCIUM SERPL-MCNC: 9.7 MG/DL (ref 8.5–10.5)
CHLORIDE SERPL-SCNC: 106 MMOL/L (ref 96–112)
CHOLEST SERPL-MCNC: 137 MG/DL (ref 100–199)
CO2 SERPL-SCNC: 25 MMOL/L (ref 20–33)
CREAT SERPL-MCNC: 0.89 MG/DL (ref 0.5–1.4)
CREAT UR-MCNC: 182.42 MG/DL
FASTING STATUS PATIENT QL REPORTED: NORMAL
GFR SERPLBLD CREATININE-BSD FMLA CKD-EPI: 72 ML/MIN/1.73 M 2
GLOBULIN SER CALC-MCNC: 2.7 G/DL (ref 1.9–3.5)
GLUCOSE SERPL-MCNC: 104 MG/DL (ref 65–99)
HDLC SERPL-MCNC: 47 MG/DL
LDLC SERPL CALC-MCNC: 70 MG/DL
MICROALBUMIN UR-MCNC: 1.9 MG/DL
MICROALBUMIN/CREAT UR: 10 MG/G (ref 0–30)
POTASSIUM SERPL-SCNC: 4.2 MMOL/L (ref 3.6–5.5)
PROT SERPL-MCNC: 7.1 G/DL (ref 6–8.2)
SODIUM SERPL-SCNC: 142 MMOL/L (ref 135–145)
T4 FREE SERPL-MCNC: 1.31 NG/DL (ref 0.93–1.7)
TRIGL SERPL-MCNC: 101 MG/DL (ref 0–149)
TSH SERPL-ACNC: 1.54 UIU/ML (ref 0.35–5.5)

## 2024-11-27 PROCEDURE — 80053 COMPREHEN METABOLIC PANEL: CPT

## 2024-11-27 PROCEDURE — 80061 LIPID PANEL: CPT

## 2024-11-27 PROCEDURE — 84443 ASSAY THYROID STIM HORMONE: CPT

## 2024-11-27 PROCEDURE — 82043 UR ALBUMIN QUANTITATIVE: CPT

## 2024-11-27 PROCEDURE — 82306 VITAMIN D 25 HYDROXY: CPT

## 2024-11-27 PROCEDURE — 36415 COLL VENOUS BLD VENIPUNCTURE: CPT

## 2024-11-27 PROCEDURE — 82570 ASSAY OF URINE CREATININE: CPT

## 2024-11-27 PROCEDURE — 84439 ASSAY OF FREE THYROXINE: CPT

## 2024-12-06 DIAGNOSIS — E11.9 TYPE 2 DIABETES MELLITUS WITHOUT COMPLICATION, WITHOUT LONG-TERM CURRENT USE OF INSULIN (HCC): ICD-10-CM

## 2024-12-09 ENCOUNTER — OFFICE VISIT (OUTPATIENT)
Dept: ENDOCRINOLOGY | Facility: MEDICAL CENTER | Age: 64
End: 2024-12-09
Payer: COMMERCIAL

## 2024-12-09 ENCOUNTER — TELEPHONE (OUTPATIENT)
Dept: ENDOCRINOLOGY | Facility: MEDICAL CENTER | Age: 64
End: 2024-12-09
Payer: COMMERCIAL

## 2024-12-09 VITALS
BODY MASS INDEX: 24.86 KG/M2 | DIASTOLIC BLOOD PRESSURE: 81 MMHG | OXYGEN SATURATION: 99 % | SYSTOLIC BLOOD PRESSURE: 138 MMHG | HEIGHT: 68 IN | HEART RATE: 88 BPM | WEIGHT: 164 LBS

## 2024-12-09 DIAGNOSIS — E11.9 TYPE 2 DIABETES MELLITUS WITHOUT COMPLICATION, WITHOUT LONG-TERM CURRENT USE OF INSULIN (HCC): ICD-10-CM

## 2024-12-09 DIAGNOSIS — E04.1 THYROID NODULE: ICD-10-CM

## 2024-12-09 DIAGNOSIS — E06.3 HASHIMOTO'S DISEASE: ICD-10-CM

## 2024-12-09 DIAGNOSIS — E55.9 VITAMIN D DEFICIENCY: ICD-10-CM

## 2024-12-09 DIAGNOSIS — E66.3 OVERWEIGHT (BMI 25.0-29.9): ICD-10-CM

## 2024-12-09 DIAGNOSIS — E78.5 DYSLIPIDEMIA: ICD-10-CM

## 2024-12-09 PROCEDURE — 3079F DIAST BP 80-89 MM HG: CPT

## 2024-12-09 PROCEDURE — 99211 OFF/OP EST MAY X REQ PHY/QHP: CPT

## 2024-12-09 PROCEDURE — 3075F SYST BP GE 130 - 139MM HG: CPT

## 2024-12-09 PROCEDURE — 99214 OFFICE O/P EST MOD 30 MIN: CPT

## 2024-12-09 ASSESSMENT — FIBROSIS 4 INDEX: FIB4 SCORE: 1.16

## 2024-12-09 NOTE — PROGRESS NOTES
Chief Complaint: Follow-up of Thyroid Nodule    HPI:   Jessica Bishop is a 63 y.o. female    1. Thyroid Nodule:   History of Thyroid Nodule diagnosed December 2021and is here for follow-up  Patient failed to follow-up due to life constrains, and was not able to come for a follow-up appointment until now    Thyroid US from December 3, 2021 was reviewed and discussed with the patient in detail.    This showed a 1.03 cm thyroid nodule on the right lateral thyroid lobe, solid, isoechoic, taller than wide, TR 5  Thyroid gland was heterogeneous with normal vascularity    FNA biopsy was ordered and done on 3/30/2022 unsuccessfully-3/30/2022 patient was told that thyroid nodule was too small and could not be found  On last appointment patient was agreeable to repeat thyroid ultrasound    Thyroid ultrasound done on 7/5/2022 showed:  Nodule #1 located on the right lower thyroid lobe measuring 1.11 cm previously measuring 1.03 cm solid isoechoic, taller than wide, with peripheral echogenicity TR 5    FNA biopsy was ordered on last appointment but the radiology department needed a more updated ultrasound  Ultrasound was done on 9/6/2023 showing no changes to her thyroid nodules  FNA biopsy schedule                    US of thyroid on 9/28/24:  Nodule #1  Location:  Right  lower  Size:  0.96 x 0.91 x 0.88 cm, previously 1.1 x 0.7 x 0.9 cm  Composition:  Solid-2  Echogenicity:  Isoechoic-1  Shape:  Wider than tall-0  Margins:  Smooth-0  Echogenic Foci:  Peripheral-2     ACR TIRADS points/category:  5 - TR4 - Moderately Suspicious     IMPRESSION:     Stable moderately suspicious nodule lower pole RIGHT thyroid lobe measuring approximately 1 cm (TR 4).    Denies hoarseness, dysphagia, dyspnea, anterior neck pain and anterior neck swelling.       She reports a family history of goiter.    She denies a history of radiation exposure to head or neck.    She reports fatigue related she feels this is relating to eating less carb to  reduce her weight, weight gain related to food, sweating       She denies history of taking thyroid medications.        Latest Reference Range & Units 11/27/24 07:20   Free T-4 0.93 - 1.70 ng/dL 1.31   TSH 0.350 - 5.500 uIU/mL 1.540     3.  Hashimoto's disease:  Elevated antibody levels   Ref. Range 4/2/2022 11:10   Microsomal -Tpo- Abs Latest Ref Range: 0.0 - 9.0 IU/mL 105.0 (H)   Anti-Thyroglobulin Latest Ref Range: 0.0 - 4.0 IU/mL <0.9       3.  Vitamin D deficiency:  Currently taking 2000 IUs daily vit D3    Latest Reference Range & Units 11/27/24 07:20   25-Hydroxy   Vitamin D 25 30 - 100 ng/mL 48     4.  Type 2 Diabetes:  Diet: Healthy in general, salads, minimal fats, protein high, low carbs   Exercise: going to gym, pickle ball, gardening    Last eye exam: a year and a half ago.     POC A1c on 12/9/24 is 5.4%  POC A1c on 6/3/24 is 5.3%  Currently taking: Mounjaro 5 mg weekly   Latest Reference Range & Units 01/08/24 08:27 01/08/24 08:28   C-Peptide 0.5 - 3.3 ng/mL 2.6    IA-2, Autoantibody 0.0 - 7.4 U/mL  <5.4      Latest Reference Range & Units 01/08/24 08:27   PINKY Antibody 0.0 - 5.0 IU/mL <5.0     High insulin resistance    Latest Reference Range & Units 01/08/24 08:27   Insulin Fasting 3 - 25 uIU/mL 13      Latest Reference Range & Units 07/14/23 07:36   Glucose 65 - 99 mg/dL 114 (H)          Latest Reference Range & Units 07/14/23 07:36   Glycohemoglobin 4.0 - 5.6 % 6.0 (H)   Estim. Avg Glu mg/dL 126   Fasting Status  Fasting      Latest Reference Range & Units 11/27/24 07:21   Micro Alb Creat Ratio 0 - 30 mg/g 10   Creatinine, Urine mg/dL 182.42   Microalbumin, Urine Random mg/dL 1.9      Latest Reference Range & Units 04/19/24 07:07   GFR (CKD-EPI) >60 mL/min/1.73 m 2 80       5. Overweight:   Exercise: Gym 2-3x/week and plays picke ball   Diet: healthy in general   Weight: 164 lbs in clinic today        11/14/2023  0809 11/30/2023  0821 1/23/2024  1326 Most Recent Value    Weight: 86.5 kg (190 lb 11.2  "oz) 85.3 kg (188 lb) 85.5 kg (188 lb 9.6 oz) 85.5 kg (188 lb 9.6 oz)  as of 1/23/2024   Body Mass Index:    28.68 kg/m² Abnormal   1.727 m (5' 8\")  as of 1/23/2024  85.5 kg (188 lb 9.6 oz)  as of 1/23/2024       11/10/2023   010214/14/2023   84268511/30/2023   0821Most Recent Value Weight:85.4 kg (188 lb 3.2 oz)86.5 kg (190 lb 11.2 oz)85.3 kg (188 lb)85.3 kg (188 lb)  as of 11/30/2023 Body Mass Index:28.59 kg/m² Abnormal    1.727 m (5' 8\")  as of 11/30/2023   85.3 kg (188 lb)  as of 11/30/2023     6. Dyslipidemia  Currently taking rosuvastatin 10 mg every evening   Latest Reference Range & Units 11/27/24 07:20   Cholesterol,Tot 100 - 199 mg/dL 137   Triglycerides 0 - 149 mg/dL 101   HDL >=40 mg/dL 47   LDL <100 mg/dL 70         Patient's medications, allergies, and social histories were reviewed and updated as appropriate.  ROS:      CONS:     No fever, no chills, no weight loss, no fatigue   EYES:      No diplopia, no blurry vision, no redness of eyes, no swelling of eyelids   ENT:    No hearing loss, No ear pain, No sore throat, no dysphagia, no neck swelling   CV:     No chest pain, no palpitations, no claudication, no orthopnea, no PND   PULM:    No SOB, no cough, no hemoptysis, no wheezing    GI:   No nausea, no vomiting, no diarrhea, no constipation, no bloody stools   :  Passing urine well, no dysuria, no hematuria   ENDO:   No polyuria, no polydipsia, no heat intolerance, no cold intolerance   NEURO: No headaches, no dizziness, no convulsions, no tremors   MUSC:  No joint swellings, no arthralgias, no myalgias, no weakness   SKIN:   No rash, no ulcers, no dry skin   PSYCH:   No depression, no anxiety, no difficulty sleeping       Past Medical History:  Patient Active Problem List    Diagnosis Date Noted    Hashimoto's disease 06/03/2024    Vitamin D deficiency 06/03/2024    Overweight (BMI 25.0-29.9) 06/03/2024    Other fatigue 10/25/2023    Thyroid nodule 04/26/2023    Low vitamin D level 12/27/2021    " Hypercalcemia 2020    Type 2 diabetes mellitus without complication, without long-term current use of insulin (HCC) 2020    Erythrocytosis 2020    Dyslipidemia 2019       Past Surgical History:  Past Surgical History:   Procedure Laterality Date    HYSTERECTOMY, TOTAL ABDOMINAL      ovaries remain; for endometriosis     TONSILLECTOMY          Allergies:  Patient has no known allergies.     Current Medications:    Current Outpatient Medications:     Tirzepatide (MOUNJARO) 7.5 MG/0.5ML Solution Auto-injector, Inject 7.5 mg under the skin every 7 days., Disp: 6 mL, Rfl: 3    atorvastatin (LIPITOR) 20 MG Tab, Take 1 Tablet by mouth every day., Disp: 90 Tablet, Rfl: 1    GAVILYTE-G 236 g Recon Soln, FOLLOW GI CONSULTANTS INSTRUCTION HANDOUT: OK TO [facility] PREFERRED, Disp: , Rfl:     Social History:  Social History     Socioeconomic History    Marital status: Single     Spouse name: Not on file    Number of children: Not on file    Years of education: Not on file    Highest education level: Bachelor's degree (e.g., BA, AB, BS)   Occupational History    Not on file   Tobacco Use    Smoking status: Former     Current packs/day: 0.00     Average packs/day: 1 pack/day for 20.0 years (20.0 ttl pk-yrs)     Types: Cigarettes     Start date:      Quit date:      Years since quittin.9    Smokeless tobacco: Never   Vaping Use    Vaping status: Former   Substance and Sexual Activity    Alcohol use: Yes     Comment: Occ    Drug use: Yes     Types: Marijuana     Comment: Gummy    Sexual activity: Not on file   Other Topics Concern    Not on file   Social History Narrative    /      Social Drivers of Health     Financial Resource Strain: Medium Risk (2023)    Overall Financial Resource Strain (CARDIA)     Difficulty of Paying Living Expenses: Somewhat hard   Food Insecurity: No Food Insecurity (2023)    Hunger Vital Sign     Worried About  "Running Out of Food in the Last Year: Never true     Ran Out of Food in the Last Year: Never true   Transportation Needs: No Transportation Needs (4/26/2023)    PRAPARE - Transportation     Lack of Transportation (Medical): No     Lack of Transportation (Non-Medical): No   Physical Activity: Insufficiently Active (4/26/2023)    Exercise Vital Sign     Days of Exercise per Week: 3 days     Minutes of Exercise per Session: 30 min   Stress: Stress Concern Present (4/26/2023)    [employer] - Occupational Stress Questionnaire     Feeling of Stress : To some extent   Social Connections: Socially Isolated (4/26/2023)    Social Connection and Isolation Panel [NHANES]     Frequency of Communication with Friends and Family: Once a week     Frequency of Social Gatherings with Friends and Family: Never     Attends [employer] Services: Never     Active Member of Clubs or Organizations: No     Attends Club or Organization Meetings: Never     Marital Status: Living with partner   Intimate Partner Violence: Not on file   Housing Stability: Low Risk  (4/26/2023)    Housing Stability Vital Sign     Unable to Pay for Housing in the Last Year: No     Number of Places Lived in the Last Year: 1     Unstable Housing in the Last Year: No        Family History:   Family History   Problem Relation Age of Onset    Stroke Mother     Heart Disease Mother     Lung Disease Father         COPD, smoker     Heart Disease Maternal Grandmother     Cancer Paternal Grandmother     Cancer Paternal Grandfather          PHYSICAL EXAM:   Vital signs: /81   Pulse 88   Ht 1.727 m (5' 8\")   Wt 74.4 kg (164 lb)   SpO2 99%   BMI 24.94 kg/m²   GENERAL: Well-developed, well-nourished  in no apparent distress.    SKIN: No rashes, lesions. Turgor is normal.    Labs:  Lab Results   Component Value Date/Time    WBC 6.8 10/05/2024 09:43 AM    RBC 5.16 10/05/2024 09:43 AM    HEMOGLOBIN 16.2 (H) 10/05/2024 09:43 AM    MCV 92.6 " 10/05/2024 09:43 AM    MCH 31.4 10/05/2024 09:43 AM    MCHC 33.9 10/05/2024 09:43 AM    RDW 45.6 10/05/2024 09:43 AM    MPV 11.0 10/05/2024 09:43 AM       Lab Results   Component Value Date/Time    SODIUM 142 11/27/2024 07:20 AM    POTASSIUM 4.2 11/27/2024 07:20 AM    CHLORIDE 106 11/27/2024 07:20 AM    CO2 25 11/27/2024 07:20 AM    ANION 11.0 11/27/2024 07:20 AM    GLUCOSE 104 (H) 11/27/2024 07:20 AM    BUN 19 11/27/2024 07:20 AM    CREATININE 0.89 11/27/2024 07:20 AM    CALCIUM 9.7 11/27/2024 07:20 AM    ASTSGOT 19 11/27/2024 07:20 AM    ALTSGPT 26 11/27/2024 07:20 AM    TBILIRUBIN 0.3 11/27/2024 07:20 AM    ALBUMIN 4.4 11/27/2024 07:20 AM    TOTPROTEIN 7.1 11/27/2024 07:20 AM    GLOBULIN 2.7 11/27/2024 07:20 AM    AGRATIO 1.6 11/27/2024 07:20 AM       Lab Results   Component Value Date/Time    TSHULTRASEN 1.960 12/15/2021 0638     Lab Results   Component Value Date/Time    FREET4 0.82 10/28/2014 0630     Imaging:    Thyroid ultrasound done on 7/5/2022 showed:  Nodule #1 located on the right lower thyroid lobe measuring 1.11 cm previously measuring 1.03 cm solid isoechoic, taller than wide, with peripheral echogenicity TR 5    ASSESSMENT/PLAN:   1. Hashimoto's disease [E06.3]  This is the etiology of Diagnosis #3    2. Type 2 diabetes mellitus without complication, without long-term current use of insulin (HCC)  Stable  Medication:  Mounjaro 5 mg weekly - change to mounjaro 7.5 mg weekly  - continue Exercise for least 150 minutes/week  - Stable hydration  - Daily feet check  - Maintain a healthy diet, minimal carbohydrate, portion control   --Pt has insulin resistance-see HPI   - Tirzepatide (MOUNJARO) 7.5 MG/0.5ML Solution Auto-injector; Inject 7.5 mg under the skin every 7 days.  Dispense: 6 mL; Refill: 3    3. Thyroid nodule  Stable  Reviewed the US of thyroid with patient which showed slightly decreased nodule measuring at 0.96cm and stable  Patient denies compressive symptoms  We will continue to monitor and  repeat in one year.     4. Dyslipidemia  Stable  Continue current regimen- see HPI  This is followed by PCP    5. Vitamin D deficiency  - Stable  -Continue regimen-see HPI     6. Overweight (BMI 25.0-29.9)  Stable  - Continue Exercise for least 150 minutes/week  - Stable hydration  - Daily feet check     Disposition: Return in about 6 months (around 6/9/2025).    Do blood work 2 weeks before appt      Thank you kindly for allowing me to participate in the thyroid care plan for this patient.    Reggie Hardy A.P.R.NVimal  12/9/24    CC:   Claudia Lynne M.D.

## 2025-03-21 ENCOUNTER — HOSPITAL ENCOUNTER (OUTPATIENT)
Dept: RADIOLOGY | Facility: MEDICAL CENTER | Age: 65
End: 2025-03-21
Attending: FAMILY MEDICINE
Payer: COMMERCIAL

## 2025-03-21 DIAGNOSIS — Z12.31 VISIT FOR SCREENING MAMMOGRAM: ICD-10-CM

## 2025-03-21 PROCEDURE — 77067 SCR MAMMO BI INCL CAD: CPT

## 2025-03-26 ENCOUNTER — RESULTS FOLLOW-UP (OUTPATIENT)
Dept: MEDICAL GROUP | Facility: PHYSICIAN GROUP | Age: 65
End: 2025-03-26

## 2025-04-21 NOTE — TELEPHONE ENCOUNTER
Received request via: Pharmacy    Was the patient seen in the last year in this department? Yes    Does the patient have an active prescription (recently filled or refills available) for medication(s) requested? No    Pharmacy Name: [facility]/pharmacy #[id number] - [place], [place] - [address] [facility].     Does the patient have prison Plus and need 100-day supply? (This applies to ALL medications) Patient does not have SCP

## 2025-04-23 RX ORDER — ATORVASTATIN CALCIUM 20 MG/1
20 TABLET, FILM COATED ORAL DAILY
Qty: 90 TABLET | Refills: 1 | Status: SHIPPED | OUTPATIENT
Start: 2025-04-23

## 2025-05-12 ASSESSMENT — ACTIVITIES OF DAILY LIVING (ADL): BATHING_REQUIRES_ASSISTANCE: 0

## 2025-05-12 ASSESSMENT — PATIENT HEALTH QUESTIONNAIRE - PHQ9
1. LITTLE INTEREST OR PLEASURE IN DOING THINGS: NOT AT ALL
2. FEELING DOWN, DEPRESSED, IRRITABLE, OR HOPELESS: NOT AT ALL

## 2025-05-12 ASSESSMENT — ENCOUNTER SYMPTOMS: GENERAL WELL-BEING: GOOD

## 2025-05-15 ENCOUNTER — TELEPHONE (OUTPATIENT)
Dept: ENDOCRINOLOGY | Facility: MEDICAL CENTER | Age: 65
End: 2025-05-15
Payer: COMMERCIAL

## 2025-05-16 NOTE — TELEPHONE ENCOUNTER
Prior Authorization for Mounjaro 7.5mg/0.5mL Sopn has been APPROVED  Prior Authorization reference number: [id number]  Effective dates: 5/15/25-5/16/26  Copay: $25  Is patient eligible to fill with [facility]? Yes-Max of 34 day supply allowed  Next Steps: Patient is currently refilling prescription at preferred 3-month supply pharmacy ([facility]).     Thank you,  Alexandria Saldana, [facility]  Endocrinology Pharmacy Coordinator      
Received Renewal PA request for Mounjaro 7.5mg/0.5mL Sopn. (Quantity:2mL, Day Supply:28)  Insurance: [employer]  Member ID: [id number]  BIN: [id number]  PCN: [id number]  Group: [id number]   Ran Test claim via [facility] & medication Rejects stating prior authorization is required.    Prior Authorization has been submitted via Cover My Meds: Key [id number]  Will follow up in 24-48 business hours.     Thank you,  Alexandria Saldana, [facility]  Endocrinology Pharmacy Coordinator        
Intact

## 2025-05-19 ENCOUNTER — OFFICE VISIT (OUTPATIENT)
Dept: FAMILY PLANNING/WOMEN'S HEALTH CLINIC | Facility: PHYSICIAN GROUP | Age: 65
End: 2025-05-19
Attending: FAMILY MEDICINE
Payer: MEDICARE

## 2025-05-19 VITALS
BODY MASS INDEX: 23.95 KG/M2 | SYSTOLIC BLOOD PRESSURE: 118 MMHG | DIASTOLIC BLOOD PRESSURE: 70 MMHG | HEIGHT: 68 IN | OXYGEN SATURATION: 97 % | WEIGHT: 158 LBS | HEART RATE: 98 BPM

## 2025-05-19 DIAGNOSIS — E11.69 DYSLIPIDEMIA ASSOCIATED WITH TYPE 2 DIABETES MELLITUS (HCC): ICD-10-CM

## 2025-05-19 DIAGNOSIS — E78.5 DYSLIPIDEMIA ASSOCIATED WITH TYPE 2 DIABETES MELLITUS (HCC): ICD-10-CM

## 2025-05-19 DIAGNOSIS — Z78.0 POST-MENOPAUSAL: Primary | ICD-10-CM

## 2025-05-19 PROBLEM — E11.9 TYPE 2 DIABETES MELLITUS WITHOUT COMPLICATION, WITHOUT LONG-TERM CURRENT USE OF INSULIN (HCC): Status: RESOLVED | Noted: 2020-11-05 | Resolved: 2025-05-19

## 2025-05-19 PROBLEM — E66.3 OVERWEIGHT (BMI 25.0-29.9): Status: RESOLVED | Noted: 2024-06-03 | Resolved: 2025-05-19

## 2025-05-19 PROCEDURE — G0402 INITIAL PREVENTIVE EXAM: HCPCS | Performed by: NURSE PRACTITIONER

## 2025-05-19 PROCEDURE — 3074F SYST BP LT 130 MM HG: CPT | Performed by: NURSE PRACTITIONER

## 2025-05-19 PROCEDURE — 1126F AMNT PAIN NOTED NONE PRSNT: CPT | Performed by: NURSE PRACTITIONER

## 2025-05-19 PROCEDURE — 3078F DIAST BP <80 MM HG: CPT | Performed by: NURSE PRACTITIONER

## 2025-05-19 SDOH — ECONOMIC STABILITY: FOOD INSECURITY: WITHIN THE PAST 12 MONTHS, YOU WORRIED THAT YOUR FOOD WOULD RUN OUT BEFORE YOU GOT THE MONEY TO BUY MORE.: NEVER TRUE

## 2025-05-19 SDOH — ECONOMIC STABILITY: FOOD INSECURITY: WITHIN THE PAST 12 MONTHS, THE FOOD YOU BOUGHT JUST DIDN'T LAST AND YOU DIDN'T HAVE MONEY TO GET MORE.: NEVER TRUE

## 2025-05-19 SDOH — ECONOMIC STABILITY: FOOD INSECURITY: HOW HARD IS IT FOR YOU TO PAY FOR THE VERY BASICS LIKE FOOD, HOUSING, MEDICAL CARE, AND HEATING?: NOT HARD AT ALL

## 2025-05-19 SDOH — ECONOMIC STABILITY: TRANSPORTATION INSECURITY: IN THE PAST 12 MONTHS, HAS LACK OF TRANSPORTATION KEPT YOU FROM MEDICAL APPOINTMENTS OR FROM GETTING MEDICATIONS?: NO

## 2025-05-19 ASSESSMENT — ACTIVITIES OF DAILY LIVING (ADL): LACK_OF_TRANSPORTATION: NO

## 2025-05-19 ASSESSMENT — PAIN SCALES - GENERAL: PAINLEVEL_OUTOF10: NO PAIN

## 2025-05-19 ASSESSMENT — PATIENT HEALTH QUESTIONNAIRE - PHQ9: CLINICAL INTERPRETATION OF PHQ2 SCORE: 0

## 2025-05-19 ASSESSMENT — FIBROSIS 4 INDEX: FIB4 SCORE: 1.18

## 2025-05-19 NOTE — ASSESSMENT & PLAN NOTE
Stable on atorvastatin./ Mounjaro  Records review      Latest Reference Range & Units 11/27/24 07:20   Cholesterol,Tot 100 - 199 mg/dL 137   Triglycerides 0 - 149 mg/dL 101   HDL >=40 mg/dL 47   LDL <100 mg/dL 70   Cont heart healthy diet and regular exercise. Cont f/u with PCP for ongoing monitoring and medication management

## 2025-05-19 NOTE — PROGRESS NOTES
Comprehensive Health Assessment Program     Jessica Bishop is a 65 y.o. here for her comprehensive health assessment.    Patient Active Problem List    Diagnosis Date Noted    Post-menopausal 05/19/2025    Hashimoto's disease 06/03/2024    Vitamin D deficiency 06/03/2024    Other fatigue 10/25/2023    Thyroid nodule 04/26/2023    Low vitamin D level 12/27/2021    Hypercalcemia 12/04/2020    Erythrocytosis 11/05/2020    Dyslipidemia associated with type 2 diabetes mellitus (HCC) 04/11/2019       Current Medications[1]       Current supplements as per medication list.     Allergies:   Patient has no known allergies.  Social History[2]  Family History   Problem Relation Age of Onset    Stroke Mother     Heart Disease Mother     Lung Disease Father         COPD, smoker     Heart Disease Maternal Grandmother     Cancer Paternal Grandmother     Cancer Paternal Grandfather      Jessica  has a past medical history of Hyperlipidemia (4/11/2019).   Past Surgical History[3]    Screening:  In the last six months have you experienced any leakage of urine? No    Depression Screening  Little interest or pleasure in doing things?  0 - not at all  Feeling down, depressed , or hopeless? 0 - not at all  Patient Health Questionnaire Score: 0     If depressive symptoms identified deferred to follow up visit unless specifically addressed in assessment and plan.    Interpretation of PHQ-9 Total Score   Score Severity   1-4 No Depression   5-9 Mild Depression   10-14 Moderate Depression   15-19 Moderately Severe Depression   20-27 Severe Depression    Screening for Cognitive Impairment  Do you or any of your friends or family members have any concern about your memory? No  Three Minute Recall (Village, Kitchen, Baby) 3/3    Tripp clock face with all 12 numbers and set the hands to show 10 minutes past 11.  Yes 5  Cognitive concerns identified deferred for follow up unless specifically addressed in assessment and plan.    Fall Risk  Assessment  Has the patient had two or more falls in the last year or any fall with injury in the last year?  No    Safety Assessment  Do you always wear your seatbelt?  Yes  Any changes to home needed to function safely? No  Difficulty hearing.  Yes  Patient counseled about all safety risks that were identified.    Functional Assessment ADLs  Are there any barriers preventing you from cooking for yourself or meeting nutritional needs?  No.    Are there any barriers preventing you from driving safely or obtaining transportation?  No.    Are there any barriers preventing you from using a telephone or calling for help?  No    Are there any barriers preventing you from shopping?  No.    Are there any barriers preventing you from taking care of your own finances?  No    Are there any barriers preventing you from managing your medications?  No    Are there any barriers preventing you from showering, bathing or dressing yourself? No    Are there any barriers preventing you from doing housework or laundry? No  Are there any barriers preventing you from using the toilet?No  Are you currently engaging in any exercise or physical activity?  Yes. Walking  Jogging  Pickle ball    Self-Assessment of Health  What is your perception of your health? Good    Do you sleep more than six hours a night? Yes    In the past 7 days, how much did pain keep you from doing your normal work? None    Do you spend quality time with family or friends (virtually or in person)? Yes    Do you usually eat a heart healthy diet that constists of a variety of fruits, vegetables, whole grains and fiber? Yes    Do you eat foods high in fat and/or Fast Food more than three times per week? No    How concerned are you that your medical conditions are not being well managed? Not at all    Are you worried that in the next 2 months, you may not have stable housing that you own, rent, or stay in as part of a household? No      Advance Care Planning  Do you have  an Advance Directive, Living Will, Durable Power of , or POLST? No                  Health Maintenance Summary            Current Care Gaps       COVID-19 Vaccine (7 - 2024-25 season) Overdue since 4/4/2025      10/04/2024  Imm Admin: Covid-19, Subunit, Rs-nanoparticle Historical-Data    10/09/2023  Imm Admin: COVID-19, mRNA, LNP-S, PF, benitez-sucrose, 30 mcg/0.3 mL    10/07/2022  Imm Admin: PFIZER BIVALENT SARS-COV-2 VACCINE (12+)    12/10/2021  Imm Admin: PFIZER PURPLE CAP SARS-COV-2 VACCINATION (12+)    04/12/2021  Imm Admin: PFIZER PURPLE CAP SARS-COV-2 VACCINATION (12+)     Only the first 5 history entries have been loaded, but more history exists.            IMM DTaP/Tdap/Td Vaccine (2 - Td or Tdap) Overdue since 4/11/2025 04/11/2015  Imm Admin: Tdap Vaccine              Pneumococcal Vaccine: 50+ Years (1 of 1 - PCV) Never done      No completion history exists for this topic.                      Needs Review       Hepatitis C Screening  Tentatively Complete      11/05/2020  Hepatitis C Antibody component of HEP C VIRUS ANTIBODY                      Awaiting Completion       Bone Density Scan (Every 5 Years) Order placed this encounter      05/19/2025  Order placed for DS-BONE DENSITY STUDY (DEXA) by LUDWIG Etienne                      Upcoming       Mammogram (Yearly) Next due on 3/21/2026      03/21/2025  MA-SCREENING MAMMO BILAT W/TOMOSYNTHESIS W/CAD    03/28/2023  MA-SCREENING MAMMO BILAT W/TOMOSYNTHESIS W/CAD    01/26/2022  MA-SCREENING MAMMO BILAT W/TOMOSYNTHESIS W/O CAD    09/11/2020  MA-SCREENING MAMMO BILAT W/TOMOSYNTHESIS W/CAD    09/04/2018  MA-SCREEN MAMMO W/CAD-BILAT     Only the first 5 history entries have been loaded, but more history exists.            Colorectal Cancer Screening (Colonoscopy - Every 3 Years) Next due on 11/18/2027 11/18/2024  COLONOSCOPY RESULTS    11/18/2024  COLONOSCOPY RESULTS                      Completed or No Longer Recommended        Influenza Vaccine (Series Information) Completed      10/04/2024  Imm Admin: Influenza split virus trivalent (PF)    10/09/2023  Imm Admin: Influenza Vaccine Quad Inj (Pf)    10/07/2022  Imm Admin: Influenza Vaccine Quad Inj (Pf)    09/22/2021  Imm Admin: Influenza Vaccine Quad Inj (Pf)    09/07/2020  Imm Admin: Influenza Vaccine Quad Inj (Pf)      Only the first 5 history entries have been loaded, but more history exists.              Zoster (Shingles) Vaccines (Series Information) Completed      07/04/2019  Imm Admin: Zoster Vaccine Recombinant (RZV) (SHINGRIX)    04/06/2019  Imm Admin: Zoster Vaccine Recombinant (RZV) (SHINGRIX)              Hepatitis A Vaccine (Hep A) (Series Information) Aged Out      No completion history exists for this topic.              Hepatitis B Vaccine (Hep B) (Series Information) Aged Out     No completion history exists for this topic.              HPV Vaccines (Series Information) Aged Out     No completion history exists for this topic.              Polio Vaccine (Inactivated Polio) (Series Information) Aged Out     No completion history exists for this topic.              Meningococcal Immunization (Series Information) Aged Out     No completion history exists for this topic.              Meningococcal B Vaccine (Series Information) Aged Out     No completion history exists for this topic.              A1c Screening  Discontinued        Frequency changed to Never automatically (Topic No Longer Applies)    10/05/2024  HEMOGLOBIN A1C    06/03/2024  POCT Hemoglobin A1C    07/14/2023  HEMOGLOBIN A1C    03/13/2023  HEMOGLOBIN A1C     Only the first 5 history entries have been loaded, but more history exists.            Fasting Lipid Profile  Discontinued        Frequency changed to Never automatically (Topic No Longer Applies)    11/27/2024  Lipid Profile    10/05/2024  Lipid Profile    04/19/2024  Lipid Profile    07/14/2023  Lipid Profile      Only the first 5 history entries have  "been loaded, but more history exists.              Diabetes: Urine Protein Screening  Discontinued        Frequency changed to Never automatically (Topic No Longer Applies)    11/27/2024  MICROALBUMIN CREAT RATIO URINE    03/13/2023  MICROALBUMIN CREAT RATIO URINE    11/05/2020  MICROALBUMIN CREAT RATIO URINE              SERUM CREATININE  Discontinued        Frequency changed to Never automatically (Topic No Longer Applies)    11/27/2024  Comp Metabolic Panel    10/05/2024  Comp Metabolic Panel    04/19/2024  Comp Metabolic Panel    07/14/2023  Comp Metabolic Panel      Only the first 5 history entries have been loaded, but more history exists.                            Patient Care Team:  Claudia Lynne M.D. as PCP - General (Family Medicine)  Yahir Zuniga M.D. (Gastroenterology)      Financial Resource Strain: Low Risk  (5/19/2025)    Overall Financial Resource Strain (CARDIA)     Difficulty of Paying Living Expenses: Not hard at all      Transportation Needs: No Transportation Needs (5/19/2025)    PRAPARE - Transportation     Lack of Transportation (Medical): No     Lack of Transportation (Non-Medical): No      Food Insecurity: No Food Insecurity (5/19/2025)    Hunger Vital Sign     Worried About Running Out of Food in the Last Year: Never true     Ran Out of Food in the Last Year: Never true        Encounter Vitals  Blood Pressure : 118/70  Pulse: 98  Pulse Oximetry: 97 %  Weight: 71.7 kg (158 lb)  Height: 172.7 cm (5' 8\")  BMI (Calculated): 24.02  Pain Score: No pain     ROS:  No fever, chills, nausea, vomiting, diarrhea, chest pain or shortness of breath. See HPI.    Physical Exam:  Constitutional: NAD  HENMT: NC/AT, OP clear, TM's clear, no lymphadenopathy, no thyromegaly.  No JVD,  (-) carotid bruit   Cardiovascular: RRR, No murmur   Lungs: CTAB, bilat   Extremities: 2+ DP, PT and Radial pulses bilaterally. No edema (-) monofilament   Skin: No legions notes  Neurologic: Alert & oriented     Assessment and " Plan. The following treatment and monitoring plan is recommended:  Post-menopausal  BMD ordered  patient to complete in 2025. Cont f/u with PCP     Dyslipidemia associated with type 2 diabetes mellitus (HCC)  Stable on atorvastatin./ Mounjaro  Records review      Latest Reference Range & Units 11/27/24 07:20   Cholesterol,Tot 100 - 199 mg/dL 137   Triglycerides 0 - 149 mg/dL 101   HDL >=40 mg/dL 47   LDL <100 mg/dL 70   Cont heart healthy diet and regular exercise. Cont f/u with PCP for ongoing monitoring and medication management        Services suggested: No services needed at this time  Health Care Screening: Age-appropriate preventive services recommended by USPTF and ACIP covered by Medicare were discussed today. Services ordered if indicated and agreed upon by the patient.  Referrals offered: Community-based lifestyle interventions to reduce health risks and promote self-management and wellness, fall prevention, nutrition, physical activity, tobacco-use cessation, weight loss, and mental health services as per orders if indicated.    Discussion today about general wellness and lifestyle habits:    Prevent falls and reduce trip hazards; Cautioned about securing or removing rugs.  Have a working fire alarm and carbon monoxide detector.  Engage in regular physical activity and social activities.    Follow-up: Return f/u with PCP as indicated.              [1]   Current Outpatient Medications   Medication Sig Dispense Refill    atorvastatin (LIPITOR) 20 MG Tab Take 1 Tablet by mouth every day. Make a follow-up appointment with PCP prior to requesting next refill 90 Tablet 1    Tirzepatide (MOUNJARO) 7.5 MG/0.5ML Solution Auto-injector Inject 7.5 mg under the skin every 7 days. 6 mL 3     No current facility-administered medications for this visit.   [2]   Social History  Tobacco Use    Smoking status: Former     Current packs/day: 0.00     Average packs/day: 1 pack/day for 20.0 years (20.0 ttl pk-yrs)     Types:  Cigarettes     Start date:      Quit date:      Years since quittin.3    Smokeless tobacco: Never   Vaping Use    Vaping status: Former   Substance Use Topics    Alcohol use: Not Currently     Comment: once a year at new years    Drug use: Yes     Types: Marijuana     Comment: Gummy   [3]   Past Surgical History:  Procedure Laterality Date    HYSTERECTOMY, TOTAL ABDOMINAL      ovaries remain; for endometriosis     TONSILLECTOMY

## 2025-06-05 ENCOUNTER — HOSPITAL ENCOUNTER (OUTPATIENT)
Dept: RADIOLOGY | Facility: MEDICAL CENTER | Age: 65
End: 2025-06-05
Attending: NURSE PRACTITIONER
Payer: MEDICARE

## 2025-06-05 PROCEDURE — 77080 DXA BONE DENSITY AXIAL: CPT

## 2025-06-06 ENCOUNTER — DOCUMENTATION (OUTPATIENT)
Dept: HEALTH INFORMATION MANAGEMENT | Facility: OTHER | Age: 65
End: 2025-06-06
Payer: MEDICARE

## 2025-06-10 ENCOUNTER — APPOINTMENT (OUTPATIENT)
Dept: LAB | Facility: MEDICAL CENTER | Age: 65
End: 2025-06-10
Payer: MEDICARE

## 2025-06-10 DIAGNOSIS — E11.9 TYPE 2 DIABETES MELLITUS WITHOUT COMPLICATION, WITHOUT LONG-TERM CURRENT USE OF INSULIN (HCC): ICD-10-CM

## 2025-06-10 DIAGNOSIS — E06.3 HASHIMOTO'S DISEASE: Primary | ICD-10-CM

## 2025-06-10 DIAGNOSIS — E55.9 VITAMIN D DEFICIENCY: ICD-10-CM

## 2025-06-10 DIAGNOSIS — E78.5 DYSLIPIDEMIA: ICD-10-CM

## 2025-06-11 ENCOUNTER — HOSPITAL ENCOUNTER (OUTPATIENT)
Dept: LAB | Facility: MEDICAL CENTER | Age: 65
End: 2025-06-11
Payer: MEDICARE

## 2025-06-11 DIAGNOSIS — E55.9 VITAMIN D DEFICIENCY: ICD-10-CM

## 2025-06-11 DIAGNOSIS — E78.5 DYSLIPIDEMIA: ICD-10-CM

## 2025-06-11 DIAGNOSIS — E06.3 HASHIMOTO'S DISEASE: ICD-10-CM

## 2025-06-11 DIAGNOSIS — E11.9 TYPE 2 DIABETES MELLITUS WITHOUT COMPLICATION, WITHOUT LONG-TERM CURRENT USE OF INSULIN (HCC): ICD-10-CM

## 2025-06-11 LAB
25(OH)D3 SERPL-MCNC: 45 NG/ML (ref 30–100)
ALBUMIN SERPL BCP-MCNC: 4.4 G/DL (ref 3.2–4.9)
ALBUMIN/GLOB SERPL: 1.6 G/DL
ALP SERPL-CCNC: 83 U/L (ref 30–99)
ALT SERPL-CCNC: 24 U/L (ref 2–50)
ANION GAP SERPL CALC-SCNC: 11 MMOL/L (ref 7–16)
AST SERPL-CCNC: 18 U/L (ref 12–45)
BILIRUB SERPL-MCNC: 0.3 MG/DL (ref 0.1–1.5)
BUN SERPL-MCNC: 17 MG/DL (ref 8–22)
CALCIUM ALBUM COR SERPL-MCNC: 9.6 MG/DL (ref 8.5–10.5)
CALCIUM SERPL-MCNC: 9.9 MG/DL (ref 8.5–10.5)
CHLORIDE SERPL-SCNC: 108 MMOL/L (ref 96–112)
CHOLEST SERPL-MCNC: 213 MG/DL (ref 100–199)
CO2 SERPL-SCNC: 23 MMOL/L (ref 20–33)
CREAT SERPL-MCNC: 0.9 MG/DL (ref 0.5–1.4)
CREAT UR-MCNC: 180 MG/DL
GFR SERPLBLD CREATININE-BSD FMLA CKD-EPI: 71 ML/MIN/1.73 M 2
GLOBULIN SER CALC-MCNC: 2.8 G/DL (ref 1.9–3.5)
GLUCOSE SERPL-MCNC: 90 MG/DL (ref 65–99)
HDLC SERPL-MCNC: 48 MG/DL
LDLC SERPL CALC-MCNC: 131 MG/DL
MICROALBUMIN UR-MCNC: 1.7 MG/DL
MICROALBUMIN/CREAT UR: 9 MG/G (ref 0–30)
POTASSIUM SERPL-SCNC: 4.4 MMOL/L (ref 3.6–5.5)
PROT SERPL-MCNC: 7.2 G/DL (ref 6–8.2)
SODIUM SERPL-SCNC: 142 MMOL/L (ref 135–145)
T4 FREE SERPL-MCNC: 1.25 NG/DL (ref 0.93–1.7)
TRIGL SERPL-MCNC: 171 MG/DL (ref 0–149)
TSH SERPL-ACNC: 0.8 UIU/ML (ref 0.38–5.33)

## 2025-06-11 PROCEDURE — 84443 ASSAY THYROID STIM HORMONE: CPT

## 2025-06-11 PROCEDURE — 80061 LIPID PANEL: CPT

## 2025-06-11 PROCEDURE — 84439 ASSAY OF FREE THYROXINE: CPT

## 2025-06-11 PROCEDURE — 82306 VITAMIN D 25 HYDROXY: CPT

## 2025-06-11 PROCEDURE — 80053 COMPREHEN METABOLIC PANEL: CPT

## 2025-06-11 PROCEDURE — 82570 ASSAY OF URINE CREATININE: CPT

## 2025-06-11 PROCEDURE — 82043 UR ALBUMIN QUANTITATIVE: CPT

## 2025-06-11 PROCEDURE — 36415 COLL VENOUS BLD VENIPUNCTURE: CPT

## 2025-06-16 ENCOUNTER — TELEPHONE (OUTPATIENT)
Dept: PHARMACY | Facility: MEDICAL CENTER | Age: 65
End: 2025-06-16

## 2025-06-16 ENCOUNTER — OFFICE VISIT (OUTPATIENT)
Dept: ENDOCRINOLOGY | Facility: MEDICAL CENTER | Age: 65
End: 2025-06-16
Payer: MEDICARE

## 2025-06-16 VITALS
DIASTOLIC BLOOD PRESSURE: 86 MMHG | HEART RATE: 88 BPM | OXYGEN SATURATION: 99 % | WEIGHT: 157.5 LBS | BODY MASS INDEX: 23.87 KG/M2 | HEIGHT: 68 IN | SYSTOLIC BLOOD PRESSURE: 162 MMHG

## 2025-06-16 DIAGNOSIS — E78.5 DYSLIPIDEMIA: ICD-10-CM

## 2025-06-16 DIAGNOSIS — E66.3 OVERWEIGHT (BMI 25.0-29.9): ICD-10-CM

## 2025-06-16 DIAGNOSIS — E04.1 THYROID NODULE: ICD-10-CM

## 2025-06-16 DIAGNOSIS — E55.9 VITAMIN D DEFICIENCY: ICD-10-CM

## 2025-06-16 DIAGNOSIS — E11.9 TYPE 2 DIABETES MELLITUS WITHOUT COMPLICATION, WITHOUT LONG-TERM CURRENT USE OF INSULIN (HCC): Primary | ICD-10-CM

## 2025-06-16 DIAGNOSIS — E06.3 HASHIMOTO'S DISEASE: ICD-10-CM

## 2025-06-16 LAB
HBA1C MFR BLD: 5.3 % (ref ?–5.8)
POCT INT CON NEG: NEGATIVE
POCT INT CON POS: POSITIVE

## 2025-06-16 PROCEDURE — 3079F DIAST BP 80-89 MM HG: CPT

## 2025-06-16 PROCEDURE — 99213 OFFICE O/P EST LOW 20 MIN: CPT

## 2025-06-16 PROCEDURE — 3077F SYST BP >= 140 MM HG: CPT

## 2025-06-16 PROCEDURE — 99214 OFFICE O/P EST MOD 30 MIN: CPT

## 2025-06-16 PROCEDURE — 83036 HEMOGLOBIN GLYCOSYLATED A1C: CPT

## 2025-06-16 ASSESSMENT — FIBROSIS 4 INDEX: FIB4 SCORE: 1.17

## 2025-06-16 NOTE — PROGRESS NOTES
Chief Complaint: Follow-up of Thyroid Nodule    HPI:   Jessica Bishop is a 63 y.o. female    1. Thyroid Nodule:   History of Thyroid Nodule diagnosed December 2021and is here for follow-up  Patient failed to follow-up due to life constrains, and was not able to come for a follow-up appointment until now    Thyroid US from December 3, 2021 was reviewed and discussed with the patient in detail.    This showed a 1.03 cm thyroid nodule on the right lateral thyroid lobe, solid, isoechoic, taller than wide, TR 5  Thyroid gland was heterogeneous with normal vascularity    FNA biopsy was ordered and done on 3/30/2022 unsuccessfully-3/30/2022 patient was told that thyroid nodule was too small and could not be found  On last appointment patient was agreeable to repeat thyroid ultrasound    Thyroid ultrasound done on 7/5/2022 showed:  Nodule #1 located on the right lower thyroid lobe measuring 1.11 cm previously measuring 1.03 cm solid isoechoic, taller than wide, with peripheral echogenicity TR 5    FNA biopsy was ordered on last appointment but the radiology department needed a more updated ultrasound  Ultrasound was done on 9/6/2023 showing no changes to her thyroid nodules  FNA biopsy schedule                    US of thyroid on 9/28/24:  Nodule #1  Location:  Right  lower  Size:  0.96 x 0.91 x 0.88 cm, previously 1.1 x 0.7 x 0.9 cm  Composition:  Solid-2  Echogenicity:  Isoechoic-1  Shape:  Wider than tall-0  Margins:  Smooth-0  Echogenic Foci:  Peripheral-2     ACR TIRADS points/category:  5 - TR4 - Moderately Suspicious     IMPRESSION:     Stable moderately suspicious nodule lower pole RIGHT thyroid lobe measuring approximately 1 cm (TR 4).    Denies hoarseness, dysphagia, dyspnea, anterior neck pain and anterior neck swelling.       She denies fatigue, mind fog, constipation, dry skin    She denies history of taking thyroid medications.      Latest Reference Range & Units 06/11/25 07:18   TSH 0.380 - 5.330 uIU/mL  "0.795   Free T-4 0.93 - 1.70 ng/dL 1.25     3.  Hashimoto's disease:  Elevated antibody levels   Ref. Range 4/2/2022 11:10   Microsomal -Tpo- Abs Latest Ref Range: 0.0 - 9.0 IU/mL 105.0 (H)   Anti-Thyroglobulin Latest Ref Range: 0.0 - 4.0 IU/mL <0.9       3.  Vitamin D deficiency:  Currently taking 2000 IUs daily vit D3    Latest Reference Range & Units 06/11/25 07:18   25-Hydroxy   Vitamin D 25 30 - 100 ng/mL 45     4.  Type 2 Diabetes:  Diet: Healthy in general, salads, minimal fats, protein high, low carbs   Exercise: going to gym, [employer] ball, gardening    Last eye exam: 2 years ago.     POC A1c on 6/16/25 is 5.3  POC A1c on 12/9/24 is 5.4%  POC A1c on 6/3/24 is 5.3%  Currently taking: Mounjaro 7.5 mg weekly  Tolerating well.    Latest Reference Range & Units 01/08/24 08:27 01/08/24 08:28   C-Peptide 0.5 - 3.3 ng/mL 2.6    IA-2, Autoantibody 0.0 - 7.4 U/mL  <5.4      Latest Reference Range & Units 01/08/24 08:27   PINKY Antibody 0.0 - 5.0 IU/mL <5.0      Latest Reference Range & Units 07/14/23 07:36   Glycohemoglobin 4.0 - 5.6 % 6.0 (H)   Estim. Avg Glu mg/dL 126   Fasting Status  Fasting      Latest Reference Range & Units 06/11/25 07:18   Micro Alb Creat Ratio 0 - 30 mg/g 9   Creatinine, Urine mg/dL 180.00   Microalbumin, Urine Random mg/dL 1.7       5. Overweight:   Exercise: Gym 2-3x/week and plays picke ball   Diet: healthy in general   Weight: 157 lbs in clinic today        11/14/2023  0809 11/30/2023  0821 1/23/2024  1326 Most Recent Value    Weight: 86.5 kg (190 lb 11.2 oz) 85.3 kg (188 lb) 85.5 kg (188 lb 9.6 oz) 85.5 kg (188 lb 9.6 oz)  as of 1/23/2024   Body Mass Index:    28.68 kg/m² Abnormal   1.727 m (5' 8\")  as of 1/23/2024  85.5 kg (188 lb 9.6 oz)  as of 1/23/2024       11/10/2023   363262/14/2023   99312811/30/2023   0821Most Recent Value Weight:85.4 kg (188 lb 3.2 oz)86.5 kg (190 lb 11.2 oz)85.3 kg (188 lb)85.3 kg (188 lb)  as of 11/30/2023 Body Mass Index:28.59 kg/m² Abnormal    1.727 m (5' 8\")  " as of 11/30/2023   85.3 kg (188 lb)  as of 11/30/2023     6. Dyslipidemia  rosuvastatin 10 mg every evening  She stopped taking statin, however she restarted since the results.    Latest Reference Range & Units 06/11/25 07:18   Cholesterol,Tot 100 - 199 mg/dL 213 (H)   Triglycerides 0 - 149 mg/dL 171 (H)   HDL >=40 mg/dL 48   LDL <100 mg/dL 131 (H)     Patient's medications, allergies, and social histories were reviewed and updated as appropriate.  ROS:      CONS:     No fever, no chills, no weight loss, no fatigue   EYES:      No diplopia, no blurry vision, no redness of eyes, no swelling of eyelids   ENT:    No hearing loss, No ear pain, No sore throat, no dysphagia, no neck swelling   CV:     No chest pain, no palpitations, no claudication, no orthopnea, no PND   PULM:    No SOB, no cough, no hemoptysis, no wheezing    GI:   No nausea, no vomiting, no diarrhea, no constipation, no bloody stools   :  Passing urine well, no dysuria, no hematuria   ENDO:   No polyuria, no polydipsia, no heat intolerance, no cold intolerance   NEURO: No headaches, no dizziness, no convulsions, no tremors   MUSC:  No joint swellings, no arthralgias, no myalgias, no weakness   SKIN:   No rash, no ulcers, no dry skin   PSYCH:   No depression, no anxiety, no difficulty sleeping       Past Medical History:  Patient Active Problem List    Diagnosis Date Noted    Post-menopausal 05/19/2025    Hashimoto's disease 06/03/2024    Vitamin D deficiency 06/03/2024    Other fatigue 10/25/2023    Thyroid nodule 04/26/2023    Low vitamin D level 12/27/2021    Hypercalcemia 12/04/2020    Erythrocytosis 11/05/2020    Dyslipidemia associated with type 2 diabetes mellitus (HCC) 04/11/2019       Past Surgical History:  Past Surgical History:   Procedure Laterality Date    HYSTERECTOMY, TOTAL ABDOMINAL      ovaries remain; for endometriosis     TONSILLECTOMY          Allergies:  Patient has no known allergies.     Current Medications:    Current  Outpatient Medications:     Tirzepatide (MOUNJARO) 7.5 MG/0.5ML Solution Auto-injector, Inject 7.5 mg under the skin every 7 days., Disp: 6 mL, Rfl: 3    atorvastatin (LIPITOR) 20 MG Tab, Take 1 Tablet by mouth every day. Make a follow-up appointment with PCP prior to requesting next refill, Disp: 90 Tablet, Rfl: 1    Social History:  Social History     Socioeconomic History    Marital status: Single     Spouse name: Not on file    Number of children: Not on file    Years of education: Not on file    Highest education level: Bachelor's degree (e.g., BA, AB, BS)   Occupational History    Not on file   Tobacco Use    Smoking status: Former     Current packs/day: 0.00     Average packs/day: 1 pack/day for 20.0 years (20.0 ttl pk-yrs)     Types: Cigarettes     Start date:      Quit date:      Years since quittin.4    Smokeless tobacco: Never   Vaping Use    Vaping status: Former   Substance and Sexual Activity    Alcohol use: Not Currently     Comment: once a year at new years    Drug use: Yes     Types: Marijuana     Comment: Gummy    Sexual activity: Not on file   Other Topics Concern    Not on file   Social History Narrative    /      Social Drivers of Health     Financial Resource Strain: Low Risk  (2025)    Overall Financial Resource Strain (CARDIA)     Difficulty of Paying Living Expenses: Not hard at all   Food Insecurity: No Food Insecurity (2025)    Hunger Vital Sign     Worried About Running Out of Food in the Last Year: Never true     Ran Out of Food in the Last Year: Never true   Transportation Needs: No Transportation Needs (2025)    PRAPARE - Transportation     Lack of Transportation (Medical): No     Lack of Transportation (Non-Medical): No   Physical Activity: Insufficiently Active (2023)    Exercise Vital Sign     Days of Exercise per Week: 3 days     Minutes of Exercise per Session: 30 min   Stress: Stress Concern Present (2023)  "   [place] [employer] - Occupational Stress Questionnaire     Feeling of Stress : To some extent   Social Connections: Socially Isolated (4/26/2023)    Social Connection and Isolation Panel [NHANES]     Frequency of Communication with Friends and Family: Once a week     Frequency of Social Gatherings with Friends and Family: Never     Attends Restorationist Services: Never     Active Member of Clubs or Organizations: No     Attends Club or Organization Meetings: Never     Marital Status: Living with partner   Intimate Partner Violence: Not on file   Housing Stability: Low Risk  (4/26/2023)    Housing Stability Vital Sign     Unable to Pay for Housing in the Last Year: No     Number of Places Lived in the Last Year: 1     Unstable Housing in the Last Year: No        Family History:   Family History   Problem Relation Age of Onset    Stroke Mother     Heart Disease Mother     Lung Disease Father         COPD, smoker     Heart Disease Maternal Grandmother     Cancer Paternal Grandmother     Cancer Paternal Grandfather          PHYSICAL EXAM:   Vital signs: BP (!) 162/86 (BP Location: Left arm, Patient Position: Sitting, BP Cuff Size: Adult)   Pulse 88   Ht 1.727 m (5' 8\")   Wt 71.4 kg (157 lb 8 oz)   SpO2 99%   BMI 23.95 kg/m²   GENERAL: Well-developed, well-nourished  in no apparent distress.    SKIN: No rashes, lesions. Turgor is normal.    Labs:  Lab Results   Component Value Date/Time    WBC 6.8 10/05/2024 09:43 AM    RBC 5.16 10/05/2024 09:43 AM    HEMOGLOBIN 16.2 (H) 10/05/2024 09:43 AM    MCV 92.6 10/05/2024 09:43 AM    MCH 31.4 10/05/2024 09:43 AM    MCHC 33.9 10/05/2024 09:43 AM    RDW 45.6 10/05/2024 09:43 AM    MPV 11.0 10/05/2024 09:43 AM       Lab Results   Component Value Date/Time    SODIUM 142 06/11/2025 07:18 AM    POTASSIUM 4.4 06/11/2025 07:18 AM    CHLORIDE 108 06/11/2025 07:18 AM    CO2 23 06/11/2025 07:18 AM    ANION 11.0 06/11/2025 07:18 AM    GLUCOSE 90 06/11/2025 07:18 AM "    BUN 17 06/11/2025 07:18 AM    CREATININE 0.90 06/11/2025 07:18 AM    CALCIUM 9.9 06/11/2025 07:18 AM    ASTSGOT 18 06/11/2025 07:18 AM    ALTSGPT 24 06/11/2025 07:18 AM    TBILIRUBIN 0.3 06/11/2025 07:18 AM    ALBUMIN 4.4 06/11/2025 07:18 AM    TOTPROTEIN 7.2 06/11/2025 07:18 AM    GLOBULIN 2.8 06/11/2025 07:18 AM    AGRATIO 1.6 06/11/2025 07:18 AM       Lab Results   Component Value Date/Time    TSHULTRASEN 1.960 12/15/2021 0638     Lab Results   Component Value Date/Time    FREET4 0.82 10/28/2014 0630     Imaging:    Thyroid ultrasound done on 7/5/2022 showed:  Nodule #1 located on the right lower thyroid lobe measuring 1.11 cm previously measuring 1.03 cm solid isoechoic, taller than wide, with peripheral echogenicity TR 5    ASSESSMENT/PLAN:   1. Type 2 diabetes mellitus without complication, without long-term current use of insulin (HCC) (Primary)  Stable  Medication:  Mounjaro 7.5 mg weekly - continue  - continue Exercise for least 150 minutes/week  - Stable hydration  - Daily feet check  - Maintain a healthy diet, minimal carbohydrate, portion control   - POCT Hemoglobin A1C  - Comp Metabolic Panel; Future  - MICROALBUMIN CREAT RATIO URINE; Future  - Tirzepatide (MOUNJARO) 7.5 MG/0.5ML Solution Auto-injector; Inject 7.5 mg under the skin every 7 days.  Dispense: 6 mL; Refill: 3    2. Hashimoto's disease  This is the etiology of Diagnosis #5  - THYROID PEROXIDASE  (TPO) AB; Future    3. Dyslipidemia  Unstable  Continue current regimen- see HPI  - Lipid Profile; Future    4. Vitamin D deficiency  - Stable  -Continue regimen-see HPI   - VITAMIN D,25 HYDROXY (DEFICIENCY); Future    5. Thyroid nodule  stable  Patient denies compressive symptoms  We will continue to monitor and repeat in 2026.   - US-THYROID; Future  - TSH; Future  - FREE THYROXINE; Future    6. Overweight (BMI 25.0-29.9)  Stable  - Continue Exercise for least 150 minutes/week  - Stable hydration  - Daily feet check        Disposition: Return in  about 1 year (around 6/16/2026).    Do blood work 2 weeks before appt      Thank you kindly for allowing me to participate in the thyroid care plan for this patient.    JANNIE MathewRVimalN.  6/16/25    CC:   Claudia Lynne M.D.

## 2025-06-16 NOTE — TELEPHONE ENCOUNTER
Prior Authorization for MOUNJARO 7.5 MG/0.5ML Solution Auto-injector  has been approved for a quantity of 6ML , day supply 84    Prior Authorization reference number: [id number]  Insurance: [employer]  Effective dates: 06/16/2026  Copay: $117.50     Is patient eligible to fill with [facility]? Yes    Next Steps: The patient's copay exceeds $5.00. Proceed with contacting patient to offer financial assistance.    Prior Authorization has been submitted via Cover My Meds: Key ([id number])    Will follow up in 24-48 business hours.     Received [employer] PA request via [employer]  for MOUNJARO 7.5 MG/0.5ML Solution Auto-injector. (Quantity:6ML, Day Supply:84)     Insurance: [employer]  Member ID:  [id number]  BIN: [id number]  PCN: [id number]  [facility]     Ran Test claim via [place] & medication Rejects stating prior authorization is required.    Fátima Figueroa, Pharmacy Liaison/ RX Coordinator

## 2025-07-09 ENCOUNTER — TELEPHONE (OUTPATIENT)
Dept: ENDOCRINOLOGY | Facility: MEDICAL CENTER | Age: 65
End: 2025-07-09
Payer: MEDICARE

## 2025-07-14 PROCEDURE — RXMED WILLOW AMBULATORY MEDICATION CHARGE

## 2025-07-16 ENCOUNTER — PHARMACY VISIT (OUTPATIENT)
Dept: PHARMACY | Facility: MEDICAL CENTER | Age: 65
End: 2025-07-16
Payer: COMMERCIAL